# Patient Record
Sex: FEMALE | Race: OTHER | NOT HISPANIC OR LATINO | URBAN - METROPOLITAN AREA
[De-identification: names, ages, dates, MRNs, and addresses within clinical notes are randomized per-mention and may not be internally consistent; named-entity substitution may affect disease eponyms.]

---

## 2017-08-18 ENCOUNTER — OUTPATIENT (OUTPATIENT)
Dept: OUTPATIENT SERVICES | Facility: HOSPITAL | Age: 32
LOS: 1 days | End: 2017-08-18
Payer: COMMERCIAL

## 2017-08-18 VITALS
TEMPERATURE: 98 F | HEIGHT: 59 IN | SYSTOLIC BLOOD PRESSURE: 130 MMHG | RESPIRATION RATE: 16 BRPM | DIASTOLIC BLOOD PRESSURE: 84 MMHG | OXYGEN SATURATION: 98 % | WEIGHT: 169.98 LBS | HEART RATE: 95 BPM

## 2017-08-18 DIAGNOSIS — S93.439A SPRAIN OF TIBIOFIBULAR LIGAMENT OF UNSPECIFIED ANKLE, INITIAL ENCOUNTER: ICD-10-CM

## 2017-08-18 DIAGNOSIS — Z96.7 PRESENCE OF OTHER BONE AND TENDON IMPLANTS: Chronic | ICD-10-CM

## 2017-08-18 DIAGNOSIS — K08.499 PARTIAL LOSS OF TEETH DUE TO OTHER SPECIFIED CAUSE, UNSPECIFIED CLASS: Chronic | ICD-10-CM

## 2017-08-18 DIAGNOSIS — S82.841S DISPLACED BIMALLEOLAR FRACTURE OF RIGHT LOWER LEG, SEQUELA: ICD-10-CM

## 2017-08-18 DIAGNOSIS — Z98.890 OTHER SPECIFIED POSTPROCEDURAL STATES: Chronic | ICD-10-CM

## 2017-08-18 DIAGNOSIS — Z41.1 ENCOUNTER FOR COSMETIC SURGERY: Chronic | ICD-10-CM

## 2017-08-18 LAB
HCT VFR BLD CALC: 40.6 % — SIGNIFICANT CHANGE UP (ref 34.5–45)
HGB BLD-MCNC: 13.8 G/DL — SIGNIFICANT CHANGE UP (ref 11.5–15.5)
MCHC RBC-ENTMCNC: 29.6 PG — SIGNIFICANT CHANGE UP (ref 27–34)
MCHC RBC-ENTMCNC: 34 GM/DL — SIGNIFICANT CHANGE UP (ref 32–36)
MCV RBC AUTO: 87.1 FL — SIGNIFICANT CHANGE UP (ref 80–100)
PLATELET # BLD AUTO: 363 K/UL — SIGNIFICANT CHANGE UP (ref 150–400)
RBC # BLD: 4.66 M/UL — SIGNIFICANT CHANGE UP (ref 3.8–5.2)
RBC # FLD: 12.9 % — SIGNIFICANT CHANGE UP (ref 10.3–14.5)
WBC # BLD: 14.31 K/UL — HIGH (ref 3.8–10.5)
WBC # FLD AUTO: 14.31 K/UL — HIGH (ref 3.8–10.5)

## 2017-08-18 RX ORDER — SODIUM CHLORIDE 9 MG/ML
3 INJECTION INTRAMUSCULAR; INTRAVENOUS; SUBCUTANEOUS EVERY 8 HOURS
Qty: 0 | Refills: 0 | Status: DISCONTINUED | OUTPATIENT
Start: 2017-08-22 | End: 2017-09-06

## 2017-08-18 RX ORDER — CEFAZOLIN SODIUM 1 G
2000 VIAL (EA) INJECTION ONCE
Qty: 0 | Refills: 0 | Status: DISCONTINUED | OUTPATIENT
Start: 2017-08-22 | End: 2017-09-06

## 2017-08-18 RX ORDER — LIDOCAINE HCL 20 MG/ML
0.2 VIAL (ML) INJECTION ONCE
Qty: 0 | Refills: 0 | Status: DISCONTINUED | OUTPATIENT
Start: 2017-08-22 | End: 2017-09-06

## 2017-08-18 NOTE — H&P PST ADULT - MUSCULOSKELETAL
details… No joint pain, swelling or deformity; no limitation of movement joint swelling/decreased ROM/Right ankle detailed exam

## 2017-08-18 NOTE — H&P PST ADULT - PSH
H/O benign breast biopsy  - Left, 2014  S/P rhinoplasty  2012 H/O benign breast biopsy  - Left, 2014  S/P ORIF (open reduction internal fixation) fracture  Right ankle, 11/2016  S/P rhinoplasty  2012
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
## 2017-08-18 NOTE — H&P PST ADULT - NSANTHOSAYNRD_GEN_A_CORE
No. [place] screening performed.  STOP BANG Legend: 0-2 = LOW Risk; 3-4 = INTERMEDIATE Risk; 5-8 = HIGH Risk

## 2017-08-22 ENCOUNTER — OUTPATIENT (OUTPATIENT)
Dept: OUTPATIENT SERVICES | Facility: HOSPITAL | Age: 32
LOS: 1 days | End: 2017-08-22
Payer: COMMERCIAL

## 2017-08-22 VITALS
RESPIRATION RATE: 14 BRPM | SYSTOLIC BLOOD PRESSURE: 118 MMHG | HEART RATE: 96 BPM | OXYGEN SATURATION: 100 % | DIASTOLIC BLOOD PRESSURE: 78 MMHG | TEMPERATURE: 97 F

## 2017-08-22 VITALS
DIASTOLIC BLOOD PRESSURE: 80 MMHG | HEART RATE: 74 BPM | OXYGEN SATURATION: 100 % | SYSTOLIC BLOOD PRESSURE: 124 MMHG | RESPIRATION RATE: 18 BRPM

## 2017-08-22 DIAGNOSIS — S93.439A SPRAIN OF TIBIOFIBULAR LIGAMENT OF UNSPECIFIED ANKLE, INITIAL ENCOUNTER: ICD-10-CM

## 2017-08-22 DIAGNOSIS — Z41.1 ENCOUNTER FOR COSMETIC SURGERY: Chronic | ICD-10-CM

## 2017-08-22 DIAGNOSIS — Z96.7 PRESENCE OF OTHER BONE AND TENDON IMPLANTS: Chronic | ICD-10-CM

## 2017-08-22 DIAGNOSIS — Z98.890 OTHER SPECIFIED POSTPROCEDURAL STATES: Chronic | ICD-10-CM

## 2017-08-22 LAB — HCG UR QL: NEGATIVE — SIGNIFICANT CHANGE UP

## 2017-08-22 PROCEDURE — 76000 FLUOROSCOPY <1 HR PHYS/QHP: CPT

## 2017-08-22 PROCEDURE — 20680 REMOVAL OF IMPLANT DEEP: CPT

## 2017-08-22 PROCEDURE — 85027 COMPLETE CBC AUTOMATED: CPT

## 2017-08-22 PROCEDURE — 81025 URINE PREGNANCY TEST: CPT

## 2017-08-22 PROCEDURE — G0463: CPT

## 2017-08-22 RX ORDER — DOCUSATE SODIUM 100 MG
1 CAPSULE ORAL
Qty: 20 | Refills: 0
Start: 2017-08-22 | End: 2017-09-01

## 2017-08-22 RX ORDER — SODIUM CHLORIDE 9 MG/ML
1000 INJECTION, SOLUTION INTRAVENOUS
Qty: 0 | Refills: 0 | Status: DISCONTINUED | OUTPATIENT
Start: 2017-08-22 | End: 2017-09-06

## 2017-08-22 RX ORDER — OXYCODONE AND ACETAMINOPHEN 5; 325 MG/1; MG/1
1 TABLET ORAL
Qty: 20 | Refills: 0
Start: 2017-08-22

## 2017-08-22 NOTE — ASU DISCHARGE PLAN (ADULT/PEDIATRIC). - MEDICATION SUMMARY - MEDICATIONS TO TAKE
I will START or STAY ON the medications listed below when I get home from the hospital:    oxycodone-acetaminophen 5mg-325mg oral tablet  -- 1 tab(s) by mouth every 6 hours, As Needed -for moderate pain MDD:4  -- Caution federal law prohibits the transfer of this drug to any person other  than the person for whom it was prescribed.  May cause drowsiness.  Alcohol may intensify this effect.  Use care when operating dangerous machinery.  This prescription cannot be refilled.  This product contains acetaminophen.  Do not use  with any other product containing acetaminophen to prevent possible liver damage.  Using more of this medication than prescribed may cause serious breathing problems.    -- Indication: For Sprain of tibiofibular ligament of ankle    Colace 50 mg oral capsule  -- 1 cap(s) by mouth 2 times a day for prevention of opioid induced constipation  -- Medication should be taken with plenty of water.    -- Indication: For Sprain of tibiofibular ligament of ankle

## 2017-08-23 ENCOUNTER — TRANSCRIPTION ENCOUNTER (OUTPATIENT)
Age: 32
End: 2017-08-23

## 2018-06-18 ENCOUNTER — APPOINTMENT (OUTPATIENT)
Dept: OBGYN | Facility: CLINIC | Age: 33
End: 2018-06-18

## 2018-07-16 ENCOUNTER — TRANSCRIPTION ENCOUNTER (OUTPATIENT)
Age: 33
End: 2018-07-16

## 2018-07-23 ENCOUNTER — APPOINTMENT (OUTPATIENT)
Dept: OBGYN | Facility: CLINIC | Age: 33
End: 2018-07-23
Payer: MEDICAID

## 2018-07-23 PROCEDURE — 99202 OFFICE O/P NEW SF 15 MIN: CPT

## 2018-10-05 ENCOUNTER — TRANSCRIPTION ENCOUNTER (OUTPATIENT)
Age: 33
End: 2018-10-05

## 2018-12-09 NOTE — H&P PST ADULT - NSANTHSNORERD_ENT_A_CORE
Telephone Encounter by Anahi Scott Anahi Scott (Patient )            Anahi Scott Patient  Sign    M - Manual             Yes

## 2019-10-30 NOTE — H&P PST ADULT - NS SC CAGE ALCOHOL CUT DOWN
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  Was tx here with metrogel in may and symptoms were much better for most of Summer  This feels slightly different  No dysuria  No lbp  Just feels "off"    UA +  Rx keflex 500mg bid x 7 days      The following portions of the patient's history were reviewed and updated as appropriate: allergies, current medications, past family history, past medical history, past social history, past surgical history and problem list     Review of Systems   Constitutional: Negative for chills, fever and unexpected weight change  Gastrointestinal: Negative for abdominal pain, blood in stool, constipation and diarrhea  Genitourinary: Negative  Objective:      /82 (BP Location: Left arm, Patient Position: Sitting, Cuff Size: Standard)   Ht 5' 1 06" (1 551 m)   Wt 72 6 kg (160 lb)   LMP  (LMP Unknown)   BMI 30 17 kg/m²          Physical Exam   Constitutional: She appears well-developed and well-nourished  Genitourinary: Vagina normal  Pelvic exam was performed with patient supine  There is no rash or lesion on the right labia  There is no rash or lesion on the left labia  Cervix exhibits no motion tenderness, no discharge and no friability  Lymphadenopathy: No inguinal adenopathy noted on the right or left side  Nursing note and vitals reviewed 
The patient is here because she has vaginal discomfort  The patient has a vaginal-rectal fistula 
no

## 2020-01-07 PROBLEM — E66.9 OBESITY, UNSPECIFIED: Chronic | Status: ACTIVE | Noted: 2017-08-18

## 2020-01-21 ENCOUNTER — LABORATORY RESULT (OUTPATIENT)
Age: 35
End: 2020-01-21

## 2020-01-22 ENCOUNTER — OUTPATIENT (OUTPATIENT)
Dept: OUTPATIENT SERVICES | Facility: HOSPITAL | Age: 35
LOS: 1 days | End: 2020-01-22
Payer: COMMERCIAL

## 2020-01-22 ENCOUNTER — APPOINTMENT (OUTPATIENT)
Dept: OBGYN | Facility: CLINIC | Age: 35
End: 2020-01-22
Payer: COMMERCIAL

## 2020-01-22 DIAGNOSIS — N76.0 ACUTE VAGINITIS: ICD-10-CM

## 2020-01-22 DIAGNOSIS — Z00.00 ENCOUNTER FOR GENERAL ADULT MEDICAL EXAMINATION W/OUT ABNORMAL FINDINGS: ICD-10-CM

## 2020-01-22 DIAGNOSIS — Z41.1 ENCOUNTER FOR COSMETIC SURGERY: Chronic | ICD-10-CM

## 2020-01-22 DIAGNOSIS — Z87.42 PERSONAL HISTORY OF OTHER DISEASES OF THE FEMALE GENITAL TRACT: ICD-10-CM

## 2020-01-22 DIAGNOSIS — Z96.7 PRESENCE OF OTHER BONE AND TENDON IMPLANTS: Chronic | ICD-10-CM

## 2020-01-22 DIAGNOSIS — Z98.890 OTHER SPECIFIED POSTPROCEDURAL STATES: Chronic | ICD-10-CM

## 2020-01-22 PROCEDURE — 88175 CYTOPATH C/V AUTO FLUID REDO: CPT

## 2020-01-22 PROCEDURE — 99395 PREV VISIT EST AGE 18-39: CPT | Mod: NC

## 2020-01-22 PROCEDURE — 87491 CHLMYD TRACH DNA AMP PROBE: CPT

## 2020-01-22 PROCEDURE — G0463: CPT

## 2020-01-22 PROCEDURE — 87591 N.GONORRHOEAE DNA AMP PROB: CPT

## 2020-01-22 PROCEDURE — 87624 HPV HI-RISK TYP POOLED RSLT: CPT

## 2020-01-22 NOTE — PROCEDURE
[Cervical Pap Smear] : cervical Pap smear [GC Chlamydia Culture] : GC Chlamydia Culture [Liquid Base] : liquid base [Tolerated Well] : the patient tolerated the procedure well [No Complications] : there were no complications

## 2020-01-22 NOTE — PHYSICAL EXAM
[Awake] : awake [Alert] : alert [Acute Distress] : no acute distress [Mass] : no breast mass [Axillary LAD] : no axillary lymphadenopathy [Nipple Discharge] : no nipple discharge [Tender] : non tender [Soft] : soft [Oriented x3] : oriented to person, place, and time [Normal] : cervix [Labia Minora] : labia minora [Labia Majora] : labia major [No Bleeding] : there was no active vaginal bleeding [Nabothian Cyst ___ cm] : had a [unfilled] ~Ucm nabothian cyst [Pap Obtained] : a Pap smear was performed [Uterine Adnexae] : were not tender and not enlarged [Motion Tenderness] : there was no cervical motion tenderness

## 2020-01-23 LAB
C TRACH RRNA SPEC QL NAA+PROBE: SIGNIFICANT CHANGE UP
HPV HIGH+LOW RISK DNA PNL CVX: SIGNIFICANT CHANGE UP
N GONORRHOEA RRNA SPEC QL NAA+PROBE: SIGNIFICANT CHANGE UP
SPECIMEN SOURCE: SIGNIFICANT CHANGE UP

## 2020-01-26 LAB — CYTOLOGY SPEC DOC CYTO: SIGNIFICANT CHANGE UP

## 2020-02-03 DIAGNOSIS — Z01.419 ENCOUNTER FOR GYNECOLOGICAL EXAMINATION (GENERAL) (ROUTINE) WITHOUT ABNORMAL FINDINGS: ICD-10-CM

## 2020-07-27 ENCOUNTER — LABORATORY RESULT (OUTPATIENT)
Age: 35
End: 2020-07-27

## 2020-07-27 ENCOUNTER — APPOINTMENT (OUTPATIENT)
Dept: OBGYN | Facility: CLINIC | Age: 35
End: 2020-07-27
Payer: MEDICAID

## 2020-07-27 ENCOUNTER — OUTPATIENT (OUTPATIENT)
Dept: OUTPATIENT SERVICES | Facility: HOSPITAL | Age: 35
LOS: 1 days | End: 2020-07-27
Payer: COMMERCIAL

## 2020-07-27 ENCOUNTER — RESULT CHARGE (OUTPATIENT)
Age: 35
End: 2020-07-27

## 2020-07-27 VITALS — WEIGHT: 174 LBS | DIASTOLIC BLOOD PRESSURE: 80 MMHG | BODY MASS INDEX: 35.14 KG/M2 | SYSTOLIC BLOOD PRESSURE: 122 MMHG

## 2020-07-27 DIAGNOSIS — N76.0 ACUTE VAGINITIS: ICD-10-CM

## 2020-07-27 DIAGNOSIS — Z98.890 OTHER SPECIFIED POSTPROCEDURAL STATES: Chronic | ICD-10-CM

## 2020-07-27 DIAGNOSIS — Z87.440 PERSONAL HISTORY OF URINARY (TRACT) INFECTIONS: ICD-10-CM

## 2020-07-27 DIAGNOSIS — Z41.1 ENCOUNTER FOR COSMETIC SURGERY: Chronic | ICD-10-CM

## 2020-07-27 DIAGNOSIS — Z96.7 PRESENCE OF OTHER BONE AND TENDON IMPLANTS: Chronic | ICD-10-CM

## 2020-07-27 PROCEDURE — 87389 HIV-1 AG W/HIV-1&-2 AB AG IA: CPT

## 2020-07-27 PROCEDURE — 86780 TREPONEMA PALLIDUM: CPT

## 2020-07-27 PROCEDURE — 81003 URINALYSIS AUTO W/O SCOPE: CPT

## 2020-07-27 PROCEDURE — G0463: CPT

## 2020-07-27 PROCEDURE — 99213 OFFICE O/P EST LOW 20 MIN: CPT | Mod: NC

## 2020-07-27 PROCEDURE — 87340 HEPATITIS B SURFACE AG IA: CPT

## 2020-07-27 PROCEDURE — 86803 HEPATITIS C AB TEST: CPT

## 2020-07-28 LAB
HBV SURFACE AG SER-ACNC: SIGNIFICANT CHANGE UP
HCV AB S/CO SERPL IA: 0.09 S/CO — SIGNIFICANT CHANGE UP (ref 0–0.99)
HCV AB SERPL-IMP: SIGNIFICANT CHANGE UP
HIV 1+2 AB+HIV1 P24 AG SERPL QL IA: SIGNIFICANT CHANGE UP
T PALLIDUM AB TITR SER: NEGATIVE — SIGNIFICANT CHANGE UP
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
## 2020-07-28 NOTE — PHYSICAL EXAM
[Normal] : cervix [Labia Majora] : labia major [No Bleeding] : there was no active vaginal bleeding [Normal Position] : in a normal position [Discharge] : no discharge [Pap Obtained] : a Pap smear was not performed [Tenderness] : nontender [Motion Tenderness] : there was no cervical motion tenderness [Mass ___ cm] : no uterine mass was palpated [Adnexa Tenderness] : were not tender [de-identified] : no evidence of trauma, no lesions, no vesicles.  Mild tenderness with palpation.   [FreeTextEntry4] : no bruising or lacerations noted [Ovarian Mass (___ Cm)] : there were no adnexal masses

## 2020-07-29 LAB
BILIRUB UR QL STRIP: NORMAL
GLUCOSE UR-MCNC: NORMAL
HCG UR QL: 0.2 EU/DL
HCG UR QL: NEGATIVE
HGB UR QL STRIP.AUTO: NORMAL
KETONES UR-MCNC: NORMAL
LEUKOCYTE ESTERASE UR QL STRIP: NORMAL
NITRITE UR QL STRIP: NORMAL
PH UR STRIP: 6
PROT UR STRIP-MCNC: NORMAL
SP GR UR STRIP: 1.02

## 2020-08-08 DIAGNOSIS — Z11.3 ENCOUNTER FOR SCREENING FOR INFECTIONS WITH A PREDOMINANTLY SEXUAL MODE OF TRANSMISSION: ICD-10-CM

## 2020-08-08 DIAGNOSIS — N39.0 URINARY TRACT INFECTION, SITE NOT SPECIFIED: ICD-10-CM

## 2020-11-26 ENCOUNTER — TRANSCRIPTION ENCOUNTER (OUTPATIENT)
Age: 35
End: 2020-11-26

## 2020-12-04 ENCOUNTER — OUTPATIENT (OUTPATIENT)
Dept: OUTPATIENT SERVICES | Facility: HOSPITAL | Age: 35
LOS: 1 days | Discharge: HOME | End: 2020-12-04

## 2020-12-04 DIAGNOSIS — Z96.7 PRESENCE OF OTHER BONE AND TENDON IMPLANTS: Chronic | ICD-10-CM

## 2020-12-04 DIAGNOSIS — D72.89 OTHER SPECIFIED DISORDERS OF WHITE BLOOD CELLS: ICD-10-CM

## 2020-12-04 DIAGNOSIS — Z98.890 OTHER SPECIFIED POSTPROCEDURAL STATES: Chronic | ICD-10-CM

## 2020-12-04 DIAGNOSIS — Z41.1 ENCOUNTER FOR COSMETIC SURGERY: Chronic | ICD-10-CM

## 2020-12-07 ENCOUNTER — RESULT REVIEW (OUTPATIENT)
Age: 35
End: 2020-12-07

## 2020-12-07 ENCOUNTER — APPOINTMENT (OUTPATIENT)
Dept: HEMATOLOGY ONCOLOGY | Facility: CLINIC | Age: 35
End: 2020-12-07
Payer: MEDICAID

## 2020-12-07 VITALS
SYSTOLIC BLOOD PRESSURE: 126 MMHG | BODY MASS INDEX: 34.2 KG/M2 | WEIGHT: 178.79 LBS | DIASTOLIC BLOOD PRESSURE: 85 MMHG | OXYGEN SATURATION: 98 % | HEIGHT: 60.63 IN | TEMPERATURE: 97.2 F | RESPIRATION RATE: 16 BRPM | HEART RATE: 96 BPM

## 2020-12-07 DIAGNOSIS — R76.8 OTHER SPECIFIED ABNORMAL IMMUNOLOGICAL FINDINGS IN SERUM: ICD-10-CM

## 2020-12-07 LAB
BASOPHILS # BLD AUTO: 0.08 K/UL — SIGNIFICANT CHANGE UP (ref 0–0.2)
BASOPHILS NFR BLD AUTO: 0.5 % — SIGNIFICANT CHANGE UP (ref 0–2)
EOSINOPHIL # BLD AUTO: 0.17 K/UL — SIGNIFICANT CHANGE UP (ref 0–0.5)
EOSINOPHIL NFR BLD AUTO: 1 % — SIGNIFICANT CHANGE UP (ref 0–6)
ERYTHROCYTE [SEDIMENTATION RATE] IN BLOOD: 8 MM/HR — SIGNIFICANT CHANGE UP (ref 0–15)
HCT VFR BLD CALC: 44.3 % — SIGNIFICANT CHANGE UP (ref 34.5–45)
HGB BLD-MCNC: 15.1 G/DL — SIGNIFICANT CHANGE UP (ref 11.5–15.5)
IMM GRANULOCYTES NFR BLD AUTO: 1 % — SIGNIFICANT CHANGE UP (ref 0–1.5)
LYMPHOCYTES # BLD AUTO: 15.2 % — SIGNIFICANT CHANGE UP (ref 13–44)
LYMPHOCYTES # BLD AUTO: 2.55 K/UL — SIGNIFICANT CHANGE UP (ref 1–3.3)
MCHC RBC-ENTMCNC: 29.4 PG — SIGNIFICANT CHANGE UP (ref 27–34)
MCHC RBC-ENTMCNC: 34.1 G/DL — SIGNIFICANT CHANGE UP (ref 32–36)
MCV RBC AUTO: 86.2 FL — SIGNIFICANT CHANGE UP (ref 80–100)
MONOCYTES # BLD AUTO: 0.96 K/UL — HIGH (ref 0–0.9)
MONOCYTES NFR BLD AUTO: 5.7 % — SIGNIFICANT CHANGE UP (ref 2–14)
NEUTROPHILS # BLD AUTO: 12.85 K/UL — HIGH (ref 1.8–7.4)
NEUTROPHILS NFR BLD AUTO: 76.6 % — SIGNIFICANT CHANGE UP (ref 43–77)
NRBC # BLD: 0 /100 WBCS — SIGNIFICANT CHANGE UP (ref 0–0)
PLATELET # BLD AUTO: 419 K/UL — HIGH (ref 150–400)
RBC # BLD: 5.14 M/UL — SIGNIFICANT CHANGE UP (ref 3.8–5.2)
RBC # FLD: 11.9 % — SIGNIFICANT CHANGE UP (ref 10.3–14.5)
WBC # BLD: 16.78 K/UL — HIGH (ref 3.8–10.5)
WBC # FLD AUTO: 16.78 K/UL — HIGH (ref 3.8–10.5)

## 2020-12-07 PROCEDURE — 99204 OFFICE O/P NEW MOD 45 MIN: CPT

## 2020-12-07 PROCEDURE — 99072 ADDL SUPL MATRL&STAF TM PHE: CPT

## 2020-12-07 RX ORDER — NITROFURANTOIN (MONOHYDRATE/MACROCRYSTALS) 25; 75 MG/1; MG/1
100 CAPSULE ORAL
Qty: 14 | Refills: 0 | Status: DISCONTINUED | COMMUNITY
Start: 2020-07-27 | End: 2020-12-07

## 2020-12-07 RX ORDER — CEFTRIAXONE 250 MG/1
250 INJECTION, POWDER, FOR SOLUTION INTRAMUSCULAR; INTRAVENOUS
Qty: 1 | Refills: 0 | Status: DISCONTINUED | COMMUNITY
Start: 2020-07-28 | End: 2020-12-07

## 2020-12-07 RX ORDER — ALBUTEROL SULFATE 90 UG/1
108 (90 BASE) AEROSOL, METERED RESPIRATORY (INHALATION) EVERY 6 HOURS
Qty: 1 | Refills: 0 | Status: ACTIVE | COMMUNITY
Start: 2020-12-07

## 2020-12-07 RX ORDER — METRONIDAZOLE 500 MG/1
500 TABLET ORAL ONCE
Qty: 4 | Refills: 0 | Status: DISCONTINUED | COMMUNITY
Start: 2020-07-28 | End: 2020-12-07

## 2020-12-07 RX ORDER — AZITHROMYCIN 250 MG/1
250 TABLET, FILM COATED ORAL
Qty: 4 | Refills: 0 | Status: DISCONTINUED | COMMUNITY
Start: 2020-07-28 | End: 2020-12-07

## 2020-12-07 RX ORDER — ULIPRISTAL ACETATE 30 MG/1
30 TABLET ORAL
Qty: 1 | Refills: 0 | Status: DISCONTINUED | COMMUNITY
Start: 2020-07-27 | End: 2020-12-07
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
## 2020-12-08 NOTE — ASSESSMENT
[FreeTextEntry1] : This is a 35 year old woman with a syndrome of worsening allergic type reactions, skin rashes, dermatographia, and asthma exacerbations.  Patient denies dizziness or syncope.  Will check tryptase to evaluate for mast cell disorder, evaluate for KIT mutation.  Check Immunoglobulins and IgE again and run SPEP, free light chains, and  immunofixation rule out clonal process, screen for plasma cell dyscrasia.  Run flow cytometry rule out clonal lymphocytic disorder.   if tests are negative, suspect that the IgE elevation and leukocytosis is a reactive process, likely to allergic reaction. Can seek allergy/immunology evaluation following rule out of hematologic disorders. \par \par On a side note, patient had a breast cyst that was being followed, but missed her opportunity to do so earlier in the year given pandemic. Re-issued her script for ultrasound.
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
## 2020-12-14 LAB
ALBUMIN MFR SERPL ELPH: 60.8 %
ALBUMIN SERPL ELPH-MCNC: 4.6 G/DL
ALBUMIN SERPL-MCNC: 4.4 G/DL
ALBUMIN/GLOB SERPL: 1.6 RATIO
ALP BLD-CCNC: 122 U/L
ALPHA1 GLOB MFR SERPL ELPH: 4.4 %
ALPHA1 GLOB SERPL ELPH-MCNC: 0.3 G/DL
ALPHA2 GLOB MFR SERPL ELPH: 9.3 %
ALPHA2 GLOB SERPL ELPH-MCNC: 0.7 G/DL
ALT SERPL-CCNC: 67 U/L
ANA SER IF-ACNC: NEGATIVE
ANION GAP SERPL CALC-SCNC: 15 MMOL/L
AST SERPL-CCNC: 45 U/L
B-GLOBULIN MFR SERPL ELPH: 10.8 %
B-GLOBULIN SERPL ELPH-MCNC: 0.8 G/DL
BILIRUB SERPL-MCNC: 0.3 MG/DL
BUN SERPL-MCNC: 10 MG/DL
CALCIUM SERPL-MCNC: 9.9 MG/DL
CHLORIDE SERPL-SCNC: 99 MMOL/L
CO2 SERPL-SCNC: 26 MMOL/L
CREAT SERPL-MCNC: 0.94 MG/DL
CRP SERPL-MCNC: 1.74 MG/DL
DEPRECATED KAPPA LC FREE/LAMBDA SER: 1.55 RATIO
DEPRECATED KAPPA LC FREE/LAMBDA SER: 1.55 RATIO
GAMMA GLOB FLD ELPH-MCNC: 1.1 G/DL
GAMMA GLOB MFR SERPL ELPH: 14.7 %
GLUCOSE SERPL-MCNC: 79 MG/DL
IGA SER QL IEP: 157 MG/DL
IGE AB SERPL QL: 8 NG/ML
IGG SER QL IEP: 951 MG/DL
IGM SER QL IEP: 144 MG/DL
INTERPRETATION SERPL IEP-IMP: NORMAL
KAPPA LC CSF-MCNC: 1.54 MG/DL
KAPPA LC CSF-MCNC: 1.54 MG/DL
KAPPA LC SERPL-MCNC: 2.38 MG/DL
KAPPA LC SERPL-MCNC: 2.38 MG/DL
M PROTEIN SPEC IFE-MCNC: NORMAL
POTASSIUM SERPL-SCNC: 4.1 MMOL/L
PROT SERPL-MCNC: 7.2 G/DL
RHEUMATOID FACT SER QL: <10 IU/ML
SODIUM SERPL-SCNC: 140 MMOL/L
TRYPTASE: 3.3 UG/L

## 2020-12-24 LAB — KIT ASP816VAL MUTATION B: NORMAL

## 2021-01-18 ENCOUNTER — APPOINTMENT (OUTPATIENT)
Dept: ALLERGY | Facility: CLINIC | Age: 36
End: 2021-01-18
Payer: MEDICAID

## 2021-01-18 VITALS — RESPIRATION RATE: 16 BRPM | OXYGEN SATURATION: 99 % | HEIGHT: 59 IN | TEMPERATURE: 98.5 F | HEART RATE: 98 BPM

## 2021-01-18 VITALS — SYSTOLIC BLOOD PRESSURE: 126 MMHG | DIASTOLIC BLOOD PRESSURE: 70 MMHG

## 2021-01-18 DIAGNOSIS — J45.30 MILD PERSISTENT ASTHMA, UNCOMPLICATED: ICD-10-CM

## 2021-01-18 PROCEDURE — 99204 OFFICE O/P NEW MOD 45 MIN: CPT

## 2021-01-18 PROCEDURE — 99072 ADDL SUPL MATRL&STAF TM PHE: CPT

## 2021-01-18 RX ORDER — CLINDAMYCIN PHOSPHATE 10 MG/ML
1 SOLUTION TOPICAL
Qty: 30 | Refills: 0 | Status: DISCONTINUED | COMMUNITY
Start: 2020-09-16

## 2021-01-18 RX ORDER — ALBUTEROL SULFATE 2.5 MG/3ML
(2.5 MG/3ML) SOLUTION RESPIRATORY (INHALATION)
Qty: 150 | Refills: 0 | Status: DISCONTINUED | COMMUNITY
Start: 2020-10-06

## 2021-01-18 RX ORDER — TRIAMCINOLONE ACETONIDE 1 MG/G
0.1 CREAM TOPICAL
Qty: 80 | Refills: 0 | Status: DISCONTINUED | COMMUNITY
Start: 2020-09-16

## 2021-01-18 RX ORDER — ALBUTEROL SULFATE 90 UG/1
108 (90 BASE) POWDER, METERED RESPIRATORY (INHALATION)
Qty: 1 | Refills: 0 | Status: DISCONTINUED | COMMUNITY
Start: 2020-08-06

## 2021-01-18 RX ORDER — PREDNISONE 20 MG/1
20 TABLET ORAL
Qty: 10 | Refills: 0 | Status: DISCONTINUED | COMMUNITY
Start: 2020-10-06

## 2021-01-18 RX ORDER — METHYLPREDNISOLONE 4 MG/1
4 TABLET ORAL
Qty: 21 | Refills: 0 | Status: DISCONTINUED | COMMUNITY
Start: 2020-08-27

## 2021-01-18 RX ORDER — OFLOXACIN 3 MG/ML
0.3 SOLUTION/ DROPS OPHTHALMIC
Qty: 5 | Refills: 0 | Status: DISCONTINUED | COMMUNITY
Start: 2020-11-26

## 2021-01-18 NOTE — SOCIAL HISTORY
[House] : [unfilled] lives in a house  [Radiator/Baseboard] : heating provided by radiator(s)/baseboard(s) [Window Units] : air conditioning provided by window units [None] : none [] :  [de-identified] : lives alone  [FreeTextEntry2] : teacher  [Bedroom] : not in the bedroom [Basement] : not in the basement [Living Area] : not in the living area [Smokers in Household] : there are no smokers in the home

## 2021-01-18 NOTE — PHYSICAL EXAM
[Alert] : alert [Well Nourished] : well nourished [Healthy Appearance] : healthy appearance [No Acute Distress] : no acute distress [Well Developed] : well developed [Normal Pupil & Iris Size/Symmetry] : normal pupil and iris size and symmetry [No Discharge] : no discharge [No Photophobia] : no photophobia [Sclera Not Icteric] : sclera not icteric [No Neck Mass] : no neck mass was observed [No LAD] : no lymphadenopathy [No Thyroid Mass] : no thyroid mass [Supple] : the neck was supple [Normal Rate and Effort] : normal respiratory rhythm and effort [No Crackles] : no crackles [No Retractions] : no retractions [Bilateral Audible Breath Sounds] : bilateral audible breath sounds [Normal Rate] : heart rate was normal  [Normal S1, S2] : normal S1 and S2 [No murmur] : no murmur [Regular Rhythm] : with a regular rhythm [Normal Cervical Lymph Nodes] : cervical [No clubbing] : no clubbing [No Edema] : no edema [No Cyanosis] : no cyanosis [Normal Mood] : mood was normal [Normal Affect] : affect was normal [Alert, Awake, Oriented as Age-Appropriate] : alert, awake, oriented as age appropriate [Pale mucosa] : no pale mucosa [de-identified] : dermatographism

## 2021-01-18 NOTE — HISTORY OF PRESENT ILLNESS
[Eczematous rashes] : eczematous rashes [Venom Reactions] : venom reactions [de-identified] : Patient with asthma since age 18.   She has only rescue inhaler to use prn.   Her symptoms are worse during the fall months.   She has needed ER treatment every fall 1-2x - she is treated with nebulizer and oral steroids.   Her symptoms are also more problematic with dust exposure.   She does not exercise.   She also has increased nasal and ocular allergies spring and fall.  \par \par Patient with sporadic rash on her body.   She has seen dermatologist and it was noted that she has an elevated IgE level - varied from 2076 to 872 KU/L. \par \par No history of eczema \par

## 2021-01-18 NOTE — ASSESSMENT
[FreeTextEntry1] : Mild persistent asthma:\par \par Singulair 10 mg QD preventatively \par Proair 2 puffs QID prn \par \par Dermatographism:\par \par Allegra 180 mg QD preventatively. \par \par Increased IgE secondary to underlying environmental allergies. \par \par RV 2-3 months

## 2021-01-19 RX ORDER — FEXOFENADINE HYDROCHLORIDE 180 MG/1
180 TABLET ORAL DAILY
Qty: 90 | Refills: 1 | Status: ACTIVE | COMMUNITY
Start: 2021-01-19 | End: 1900-01-01

## 2021-01-19 RX ORDER — MONTELUKAST 10 MG/1
10 TABLET, FILM COATED ORAL DAILY
Qty: 90 | Refills: 1 | Status: ACTIVE | COMMUNITY
Start: 2021-01-18 | End: 1900-01-01

## 2021-04-21 ENCOUNTER — APPOINTMENT (OUTPATIENT)
Dept: ALLERGY | Facility: CLINIC | Age: 36
End: 2021-04-21

## 2021-04-27 ENCOUNTER — LABORATORY RESULT (OUTPATIENT)
Age: 36
End: 2021-04-27

## 2021-04-27 ENCOUNTER — OUTPATIENT (OUTPATIENT)
Dept: OUTPATIENT SERVICES | Facility: HOSPITAL | Age: 36
LOS: 1 days | End: 2021-04-27
Payer: COMMERCIAL

## 2021-04-27 ENCOUNTER — APPOINTMENT (OUTPATIENT)
Dept: OBGYN | Facility: CLINIC | Age: 36
End: 2021-04-27
Payer: MEDICAID

## 2021-04-27 VITALS
HEIGHT: 59 IN | WEIGHT: 182 LBS | BODY MASS INDEX: 36.69 KG/M2 | DIASTOLIC BLOOD PRESSURE: 80 MMHG | SYSTOLIC BLOOD PRESSURE: 122 MMHG

## 2021-04-27 VITALS — TEMPERATURE: 97.8 F

## 2021-04-27 DIAGNOSIS — Z41.1 ENCOUNTER FOR COSMETIC SURGERY: Chronic | ICD-10-CM

## 2021-04-27 DIAGNOSIS — N64.4 MASTODYNIA: ICD-10-CM

## 2021-04-27 DIAGNOSIS — Z01.419 ENCOUNTER FOR GYNECOLOGICAL EXAMINATION (GENERAL) (ROUTINE) W/OUT ABNORMAL FINDINGS: ICD-10-CM

## 2021-04-27 DIAGNOSIS — Z98.890 OTHER SPECIFIED POSTPROCEDURAL STATES: Chronic | ICD-10-CM

## 2021-04-27 DIAGNOSIS — N60.09 SOLITARY CYST OF UNSPECIFIED BREAST: ICD-10-CM

## 2021-04-27 DIAGNOSIS — R10.2 PELVIC AND PERINEAL PAIN: ICD-10-CM

## 2021-04-27 DIAGNOSIS — N76.0 ACUTE VAGINITIS: ICD-10-CM

## 2021-04-27 DIAGNOSIS — Z01.419 ENCOUNTER FOR GYNECOLOGICAL EXAMINATION (GENERAL) (ROUTINE) WITHOUT ABNORMAL FINDINGS: ICD-10-CM

## 2021-04-27 DIAGNOSIS — Z11.3 ENCOUNTER FOR SCREENING FOR INFECTIONS WITH A PREDOMINANTLY SEXUAL MODE OF TRANSMISSION: ICD-10-CM

## 2021-04-27 DIAGNOSIS — Z96.7 PRESENCE OF OTHER BONE AND TENDON IMPLANTS: Chronic | ICD-10-CM

## 2021-04-27 DIAGNOSIS — T74.21XA ADULT SEXUAL ABUSE, CONFIRMED, INITIAL ENCOUNTER: ICD-10-CM

## 2021-04-27 PROCEDURE — 87491 CHLMYD TRACH DNA AMP PROBE: CPT

## 2021-04-27 PROCEDURE — 99395 PREV VISIT EST AGE 18-39: CPT

## 2021-04-27 PROCEDURE — 86780 TREPONEMA PALLIDUM: CPT

## 2021-04-27 PROCEDURE — 81003 URINALYSIS AUTO W/O SCOPE: CPT

## 2021-04-27 PROCEDURE — 81003 URINALYSIS AUTO W/O SCOPE: CPT | Mod: NC,QW

## 2021-04-27 PROCEDURE — 87591 N.GONORRHOEAE DNA AMP PROB: CPT

## 2021-04-27 PROCEDURE — 87086 URINE CULTURE/COLONY COUNT: CPT

## 2021-04-27 PROCEDURE — G0463: CPT

## 2021-04-27 PROCEDURE — 87624 HPV HI-RISK TYP POOLED RSLT: CPT

## 2021-04-27 PROCEDURE — 87389 HIV-1 AG W/HIV-1&-2 AB AG IA: CPT

## 2021-04-27 NOTE — HISTORY OF PRESENT ILLNESS
[6 Months Ago] : 6 months ago [Fair] : being in fair health [Regular Exercise] : not exercising regularly [Weight Concerns] : weight concens [Reproductive Age] : is of reproductive age [Definite:  ___ (Date)] : the last menstrual period was [unfilled] [Normal Amount/Duration] : was of a normal amount and duration [Spotting Between  Menses] : no spotting between menses [Regular Cycle Intervals] : periods have been regular [Sexually Active] : is sexually active

## 2021-04-28 ENCOUNTER — LABORATORY RESULT (OUTPATIENT)
Age: 36
End: 2021-04-28

## 2021-04-28 LAB
C TRACH RRNA SPEC QL NAA+PROBE: SIGNIFICANT CHANGE UP
HIV 1+2 AB+HIV1 P24 AG SERPL QL IA: SIGNIFICANT CHANGE UP
HPV HIGH+LOW RISK DNA PNL CVX: SIGNIFICANT CHANGE UP
N GONORRHOEA RRNA SPEC QL NAA+PROBE: SIGNIFICANT CHANGE UP
SPECIMEN SOURCE: SIGNIFICANT CHANGE UP
T PALLIDUM AB TITR SER: NEGATIVE — SIGNIFICANT CHANGE UP

## 2021-04-29 LAB
CULTURE RESULTS: SIGNIFICANT CHANGE UP
SPECIMEN SOURCE: SIGNIFICANT CHANGE UP

## 2021-04-30 LAB — CYTOLOGY SPEC DOC CYTO: SIGNIFICANT CHANGE UP

## 2021-07-02 ENCOUNTER — NON-APPOINTMENT (OUTPATIENT)
Age: 36
End: 2021-07-02

## 2021-07-29 ENCOUNTER — TRANSCRIPTION ENCOUNTER (OUTPATIENT)
Age: 36
End: 2021-07-29

## 2021-07-30 ENCOUNTER — APPOINTMENT (OUTPATIENT)
Dept: DISASTER EMERGENCY | Facility: HOSPITAL | Age: 36
End: 2021-07-30

## 2022-08-03 ENCOUNTER — APPOINTMENT (OUTPATIENT)
Dept: HUMAN REPRODUCTION | Facility: CLINIC | Age: 37
End: 2022-08-03

## 2022-12-21 NOTE — BRIEF OPERATIVE NOTE - SPECIMENS
None Adjacent Tissue Transfer Text: The defect edges were debeveled with a #15 scalpel blade.  Given the location of the defect and the proximity to free margins an adjacent tissue transfer was deemed most appropriate.  Using a sterile surgical marker, an appropriate flap was drawn incorporating the defect and placing the expected incisions within the relaxed skin tension lines where possible.    The area thus outlined was incised deep to adipose tissue with a #15 scalpel blade.  The skin margins were undermined to an appropriate distance in all directions utilizing iris scissors.

## 2023-06-06 ENCOUNTER — INPATIENT (INPATIENT)
Facility: HOSPITAL | Age: 38
LOS: 1 days | Discharge: HOME | DRG: 392 | End: 2023-06-08
Attending: INTERNAL MEDICINE | Admitting: INTERNAL MEDICINE
Payer: COMMERCIAL

## 2023-06-06 VITALS
HEART RATE: 108 BPM | OXYGEN SATURATION: 96 % | HEIGHT: 60 IN | WEIGHT: 171.08 LBS | DIASTOLIC BLOOD PRESSURE: 86 MMHG | SYSTOLIC BLOOD PRESSURE: 128 MMHG | RESPIRATION RATE: 20 BRPM | TEMPERATURE: 98 F

## 2023-06-06 DIAGNOSIS — Z41.1 ENCOUNTER FOR COSMETIC SURGERY: Chronic | ICD-10-CM

## 2023-06-06 DIAGNOSIS — R74.01 ELEVATION OF LEVELS OF LIVER TRANSAMINASE LEVELS: ICD-10-CM

## 2023-06-06 DIAGNOSIS — R10.9 UNSPECIFIED ABDOMINAL PAIN: ICD-10-CM

## 2023-06-06 DIAGNOSIS — Z96.7 PRESENCE OF OTHER BONE AND TENDON IMPLANTS: Chronic | ICD-10-CM

## 2023-06-06 DIAGNOSIS — Z29.9 ENCOUNTER FOR PROPHYLACTIC MEASURES, UNSPECIFIED: ICD-10-CM

## 2023-06-06 DIAGNOSIS — R79.89 OTHER SPECIFIED ABNORMAL FINDINGS OF BLOOD CHEMISTRY: ICD-10-CM

## 2023-06-06 DIAGNOSIS — Z98.890 OTHER SPECIFIED POSTPROCEDURAL STATES: Chronic | ICD-10-CM

## 2023-06-06 DIAGNOSIS — E11.9 TYPE 2 DIABETES MELLITUS WITHOUT COMPLICATIONS: ICD-10-CM

## 2023-06-06 LAB
ALBUMIN SERPL ELPH-MCNC: 4.4 G/DL — SIGNIFICANT CHANGE UP (ref 3.3–5)
ALP SERPL-CCNC: 150 U/L — HIGH (ref 40–120)
ALT FLD-CCNC: 53 U/L — HIGH (ref 10–45)
ANION GAP SERPL CALC-SCNC: 14 MMOL/L — SIGNIFICANT CHANGE UP (ref 5–17)
APPEARANCE UR: ABNORMAL
AST SERPL-CCNC: 41 U/L — HIGH (ref 10–40)
BACTERIA # UR AUTO: ABNORMAL
BASE EXCESS BLDV CALC-SCNC: -0.3 MMOL/L — SIGNIFICANT CHANGE UP (ref -2–3)
BASOPHILS # BLD AUTO: 0.06 K/UL — SIGNIFICANT CHANGE UP (ref 0–0.2)
BASOPHILS NFR BLD AUTO: 0.4 % — SIGNIFICANT CHANGE UP (ref 0–2)
BILIRUB SERPL-MCNC: 0.6 MG/DL — SIGNIFICANT CHANGE UP (ref 0.2–1.2)
BILIRUB UR-MCNC: NEGATIVE — SIGNIFICANT CHANGE UP
BUN SERPL-MCNC: 9 MG/DL — SIGNIFICANT CHANGE UP (ref 7–23)
CA-I SERPL-SCNC: 1.22 MMOL/L — SIGNIFICANT CHANGE UP (ref 1.15–1.33)
CALCIUM SERPL-MCNC: 9.5 MG/DL — SIGNIFICANT CHANGE UP (ref 8.4–10.5)
CHLORIDE BLDV-SCNC: 101 MMOL/L — SIGNIFICANT CHANGE UP (ref 96–108)
CHLORIDE SERPL-SCNC: 100 MMOL/L — SIGNIFICANT CHANGE UP (ref 96–108)
CO2 BLDV-SCNC: 28 MMOL/L — HIGH (ref 22–26)
CO2 SERPL-SCNC: 23 MMOL/L — SIGNIFICANT CHANGE UP (ref 22–31)
COLOR SPEC: YELLOW — SIGNIFICANT CHANGE UP
CREAT SERPL-MCNC: 0.71 MG/DL — SIGNIFICANT CHANGE UP (ref 0.5–1.3)
DIFF PNL FLD: ABNORMAL
EGFR: 111 ML/MIN/1.73M2 — SIGNIFICANT CHANGE UP
EOSINOPHIL # BLD AUTO: 0.17 K/UL — SIGNIFICANT CHANGE UP (ref 0–0.5)
EOSINOPHIL NFR BLD AUTO: 1 % — SIGNIFICANT CHANGE UP (ref 0–6)
EPI CELLS # UR: 11 — HIGH
GAS PNL BLDV: 134 MMOL/L — LOW (ref 136–145)
GAS PNL BLDV: SIGNIFICANT CHANGE UP
GAS PNL BLDV: SIGNIFICANT CHANGE UP
GLUCOSE BLDC GLUCOMTR-MCNC: 114 MG/DL — HIGH (ref 70–99)
GLUCOSE BLDV-MCNC: 197 MG/DL — HIGH (ref 70–99)
GLUCOSE SERPL-MCNC: 197 MG/DL — HIGH (ref 70–99)
GLUCOSE UR QL: NEGATIVE — SIGNIFICANT CHANGE UP
HCG SERPL-ACNC: <2 MIU/ML — SIGNIFICANT CHANGE UP
HCO3 BLDV-SCNC: 27 MMOL/L — SIGNIFICANT CHANGE UP (ref 22–29)
HCT VFR BLD CALC: 44.8 % — SIGNIFICANT CHANGE UP (ref 34.5–45)
HCT VFR BLDA CALC: 45 % — SIGNIFICANT CHANGE UP (ref 34.5–46.5)
HGB BLD CALC-MCNC: 14.9 G/DL — SIGNIFICANT CHANGE UP (ref 11.7–16.1)
HGB BLD-MCNC: 15.3 G/DL — SIGNIFICANT CHANGE UP (ref 11.5–15.5)
HYALINE CASTS # UR AUTO: 34 /LPF — HIGH (ref 0–7)
IMM GRANULOCYTES NFR BLD AUTO: 1 % — HIGH (ref 0–0.9)
KETONES UR-MCNC: NEGATIVE — SIGNIFICANT CHANGE UP
LACTATE BLDV-MCNC: 1.4 MMOL/L — SIGNIFICANT CHANGE UP (ref 0.5–2)
LEUKOCYTE ESTERASE UR-ACNC: NEGATIVE — SIGNIFICANT CHANGE UP
LIDOCAIN IGE QN: 18 U/L — SIGNIFICANT CHANGE UP (ref 7–60)
LYMPHOCYTES # BLD AUTO: 1.43 K/UL — SIGNIFICANT CHANGE UP (ref 1–3.3)
LYMPHOCYTES # BLD AUTO: 8.7 % — LOW (ref 13–44)
MAGNESIUM SERPL-MCNC: 1.9 MG/DL — SIGNIFICANT CHANGE UP (ref 1.6–2.6)
MCHC RBC-ENTMCNC: 29.1 PG — SIGNIFICANT CHANGE UP (ref 27–34)
MCHC RBC-ENTMCNC: 34.2 GM/DL — SIGNIFICANT CHANGE UP (ref 32–36)
MCV RBC AUTO: 85.3 FL — SIGNIFICANT CHANGE UP (ref 80–100)
MONOCYTES # BLD AUTO: 0.61 K/UL — SIGNIFICANT CHANGE UP (ref 0–0.9)
MONOCYTES NFR BLD AUTO: 3.7 % — SIGNIFICANT CHANGE UP (ref 2–14)
NEUTROPHILS # BLD AUTO: 13.95 K/UL — HIGH (ref 1.8–7.4)
NEUTROPHILS NFR BLD AUTO: 85.2 % — HIGH (ref 43–77)
NITRITE UR-MCNC: NEGATIVE — SIGNIFICANT CHANGE UP
NRBC # BLD: 0 /100 WBCS — SIGNIFICANT CHANGE UP (ref 0–0)
PCO2 BLDV: 52 MMHG — HIGH (ref 39–42)
PH BLDV: 7.32 — SIGNIFICANT CHANGE UP (ref 7.32–7.43)
PH UR: 6 — SIGNIFICANT CHANGE UP (ref 5–8)
PLATELET # BLD AUTO: 213 K/UL — SIGNIFICANT CHANGE UP (ref 150–400)
PO2 BLDV: 35 MMHG — SIGNIFICANT CHANGE UP (ref 25–45)
POTASSIUM BLDV-SCNC: 3.7 MMOL/L — SIGNIFICANT CHANGE UP (ref 3.5–5.1)
POTASSIUM SERPL-MCNC: 3.5 MMOL/L — SIGNIFICANT CHANGE UP (ref 3.5–5.3)
POTASSIUM SERPL-SCNC: 3.5 MMOL/L — SIGNIFICANT CHANGE UP (ref 3.5–5.3)
PROT SERPL-MCNC: 7.8 G/DL — SIGNIFICANT CHANGE UP (ref 6–8.3)
PROT UR-MCNC: ABNORMAL
RBC # BLD: 5.25 M/UL — HIGH (ref 3.8–5.2)
RBC # FLD: 12.2 % — SIGNIFICANT CHANGE UP (ref 10.3–14.5)
RBC CASTS # UR COMP ASSIST: 1 /HPF — SIGNIFICANT CHANGE UP (ref 0–4)
SAO2 % BLDV: 56.3 % — LOW (ref 67–88)
SODIUM SERPL-SCNC: 137 MMOL/L — SIGNIFICANT CHANGE UP (ref 135–145)
SP GR SPEC: 1.02 — SIGNIFICANT CHANGE UP (ref 1.01–1.02)
UROBILINOGEN FLD QL: NEGATIVE — SIGNIFICANT CHANGE UP
WBC # BLD: 16.39 K/UL — HIGH (ref 3.8–10.5)
WBC # FLD AUTO: 16.39 K/UL — HIGH (ref 3.8–10.5)
WBC UR QL: 2 /HPF — SIGNIFICANT CHANGE UP (ref 0–5)

## 2023-06-06 PROCEDURE — 76700 US EXAM ABDOM COMPLETE: CPT | Mod: 26

## 2023-06-06 PROCEDURE — 99285 EMERGENCY DEPT VISIT HI MDM: CPT

## 2023-06-06 PROCEDURE — 99223 1ST HOSP IP/OBS HIGH 75: CPT

## 2023-06-06 RX ORDER — ACETAMINOPHEN 500 MG
1000 TABLET ORAL ONCE
Refills: 0 | Status: COMPLETED | OUTPATIENT
Start: 2023-06-06 | End: 2023-06-06

## 2023-06-06 RX ORDER — DEXTROSE 50 % IN WATER 50 %
15 SYRINGE (ML) INTRAVENOUS ONCE
Refills: 0 | Status: DISCONTINUED | OUTPATIENT
Start: 2023-06-06 | End: 2023-06-08

## 2023-06-06 RX ORDER — SODIUM CHLORIDE 9 MG/ML
1000 INJECTION, SOLUTION INTRAVENOUS
Refills: 0 | Status: DISCONTINUED | OUTPATIENT
Start: 2023-06-06 | End: 2023-06-08

## 2023-06-06 RX ORDER — SODIUM CHLORIDE 9 MG/ML
1000 INJECTION INTRAMUSCULAR; INTRAVENOUS; SUBCUTANEOUS ONCE
Refills: 0 | Status: COMPLETED | OUTPATIENT
Start: 2023-06-06 | End: 2023-06-06

## 2023-06-06 RX ORDER — DEXTROSE 50 % IN WATER 50 %
12.5 SYRINGE (ML) INTRAVENOUS ONCE
Refills: 0 | Status: DISCONTINUED | OUTPATIENT
Start: 2023-06-06 | End: 2023-06-08

## 2023-06-06 RX ORDER — DEXTROSE 50 % IN WATER 50 %
25 SYRINGE (ML) INTRAVENOUS ONCE
Refills: 0 | Status: DISCONTINUED | OUTPATIENT
Start: 2023-06-06 | End: 2023-06-08

## 2023-06-06 RX ORDER — ACETAMINOPHEN 500 MG
650 TABLET ORAL ONCE
Refills: 0 | Status: COMPLETED | OUTPATIENT
Start: 2023-06-06 | End: 2023-06-06

## 2023-06-06 RX ORDER — FAMOTIDINE 10 MG/ML
20 INJECTION INTRAVENOUS ONCE
Refills: 0 | Status: COMPLETED | OUTPATIENT
Start: 2023-06-06 | End: 2023-06-06

## 2023-06-06 RX ORDER — ONDANSETRON 8 MG/1
4 TABLET, FILM COATED ORAL EVERY 8 HOURS
Refills: 0 | Status: DISCONTINUED | OUTPATIENT
Start: 2023-06-06 | End: 2023-06-08

## 2023-06-06 RX ORDER — GLUCAGON INJECTION, SOLUTION 0.5 MG/.1ML
1 INJECTION, SOLUTION SUBCUTANEOUS ONCE
Refills: 0 | Status: DISCONTINUED | OUTPATIENT
Start: 2023-06-06 | End: 2023-06-08

## 2023-06-06 RX ORDER — ONDANSETRON 8 MG/1
4 TABLET, FILM COATED ORAL ONCE
Refills: 0 | Status: COMPLETED | OUTPATIENT
Start: 2023-06-06 | End: 2023-06-06

## 2023-06-06 RX ORDER — INSULIN LISPRO 100/ML
VIAL (ML) SUBCUTANEOUS
Refills: 0 | Status: DISCONTINUED | OUTPATIENT
Start: 2023-06-06 | End: 2023-06-08

## 2023-06-06 RX ADMIN — Medication 400 MILLIGRAM(S): at 09:29

## 2023-06-06 RX ADMIN — SODIUM CHLORIDE 1000 MILLILITER(S): 9 INJECTION INTRAMUSCULAR; INTRAVENOUS; SUBCUTANEOUS at 10:41

## 2023-06-06 RX ADMIN — FAMOTIDINE 20 MILLIGRAM(S): 10 INJECTION INTRAVENOUS at 09:29

## 2023-06-06 RX ADMIN — ONDANSETRON 4 MILLIGRAM(S): 8 TABLET, FILM COATED ORAL at 09:29

## 2023-06-06 RX ADMIN — SODIUM CHLORIDE 1000 MILLILITER(S): 9 INJECTION INTRAMUSCULAR; INTRAVENOUS; SUBCUTANEOUS at 09:29

## 2023-06-06 RX ADMIN — Medication 650 MILLIGRAM(S): at 22:02

## 2023-06-06 NOTE — ED PROVIDER NOTE - OBJECTIVE STATEMENT
37 yo F with a PMH of DM, asthma p/w abd pain, diarrhea and chills since Saturday. Reports >8-10 episodes of watery diarrhea/day. Non-bloody. Endorsing diffuse abd pain described as cramping, worse in her epigastric area. Called her PMD who recommended she come to ED for eval to r/o pancreatitis. Pt is on Ozempic, has been on it for 3mo, last dose adjustment was 2 wks ago. Pt traveled to [facility] 3 wks ago, was feeling fine when she returned home. Denies nausea, vomiting, fever, chest pain, SOB, cough, abd distension, urinary complaints, headache, dizziness.

## 2023-06-06 NOTE — ED PROVIDER NOTE - PHYSICAL EXAMINATION
CONSTITUTIONAL: Well appearing and in no apparent distress.  ENT: Airway patent, dry mucous membranes.   EYES: Pupils equal, round and reactive to light. EOMI. Conjunctiva normal appearing.   CARDIAC: Tachycardic, regular rhythm.  Heart sounds S1, S2.    RESPIRATORY: Breath sounds clear and equal bilaterally.   GASTROINTESTINAL: Abdomen soft, epigastric TTP, not distended. No rebound or guarding.   MUSCULOSKELETAL: Spine appears normal.  NEUROLOGICAL: Alert and oriented x3, no focal deficits, no motor or sensory deficits. 5/5 muscle strength throughout.  SKIN: Skin normal color, warm, dry and intact.   PSYCHIATRIC: Normal mood and affect.

## 2023-06-06 NOTE — ED ADULT NURSE REASSESSMENT NOTE - NS ED NURSE REASSESS COMMENT FT1
1046 pt noted eating w/ offered cookies and water, pmd made aware, pt tolerating food at this time, pending u/s no nausea and vomiting noted/

## 2023-06-06 NOTE — H&P ADULT - PROBLEM SELECTOR PLAN 2
hold ozempic   if symptoms resolved consider restarting at lower, better tolerated dose as outpatient  PAIGE, diabetic diet

## 2023-06-06 NOTE — H&P ADULT - NSHPPHYSICALEXAM_GEN_ALL_CORE
Vital Signs Last 24 Hrs  T(C): 36.6 (06 Jun 2023 08:10), Max: 36.6 (06 Jun 2023 08:10)  T(F): 97.8 (06 Jun 2023 08:10), Max: 97.8 (06 Jun 2023 08:10)  HR: 108 (06 Jun 2023 08:10) (108 - 108)  BP: 128/86 (06 Jun 2023 08:10) (128/86 - 128/86)  BP(mean): --  RR: 20 (06 Jun 2023 08:10) (20 - 20)  SpO2: 96% (06 Jun 2023 08:10) (96% - 96%)    Parameters below as of 06 Jun 2023 08:10  Patient On (Oxygen Delivery Method): room air    CONSTITUTIONAL: Well-groomed, in no apparent distress, comfortable  EYES: No conjunctival or scleral injection, non-icteric;   ENMT: No external nasal lesions; nasal mucosa not inflamed; normal dentition; no pharyngeal injection or exudates, oral mucosa with moist membranes  NECK: Trachea midline without palpable neck mass; thyroid not enlarged and non-tender, no JVD  RESPIRATORY: Breathing comfortably; lungs CTA without wheeze/rhonchi/rales  CARDIOVASCULAR: RRR, no murmurs; pedal pulses full and symmetric; no lower extremity edema  GASTROINTESTINAL: No palpable masses or tenderness, +BS throughout, nondistended  MUSCULOSKELETAL: no digital clubbing or cyanosis; no paraspinal tenderness; normal strength and tone of extremities  SKIN: No rashes or ulcers noted; no subcutaneous nodules or induration palpable  NEUROLOGIC: CN II-XII intact; sensation intact in LEs b/l to light touch  PSYCHIATRIC: A+O x 3; mood and affect appropriate; appropriate insight and judgment

## 2023-06-06 NOTE — H&P ADULT - NSHPSOCIALHISTORY_GEN_ALL_CORE
Social History:      Lives with: ( x ) alone  (  ) children   (  ) spouse   (  ) parents  (  ) other    Substance Use (street drugs): ( x ) never used  (  ) other:  Tobacco Usage:  (  x ) never smoked   (   ) former smoker   (   ) current smoker  (     ) pack year  (        ) last cigarette date  Alcohol Usage: none  Denies any illicit drug use.

## 2023-06-06 NOTE — H&P ADULT - PROBLEM SELECTOR PLAN 3
as above    per pt she has known transaminitis relating to known fatty liver disease  review outpatient records    f/u MRCP to r/o biliary obstruction or other hepatic cause  f/u GI

## 2023-06-06 NOTE — CONSULT NOTE ADULT - NS ATTEND AMEND GEN_ALL_CORE FT
38 y F adm with abd pain and watery diarrhea , coinciding with start of Ozempic for DM control  Mild elevation of ALT AST likely from NAFLD  US abd no gall stones or AP  agree with plan of advance diet as tolerated  PPIs if needed

## 2023-06-06 NOTE — H&P ADULT - NSHPLABSRESULTS_GEN_ALL_CORE
Labs personally reviewed:                          15.3   16.39 )-----------( 213      ( 06 Jun 2023 09:17 )             44.8       06-06    137  |  100  |  9   ----------------------------<  197<H>  3.5   |  23  |  0.71    Ca    9.5      06 Jun 2023 09:17  Mg     1.9     06-06    TPro  7.8  /  Alb  4.4  /  TBili  0.6  /  DBili  x   /  AST  41<H>  /  ALT  53<H>  /  AlkPhos  150<H>  06-06      Imaging personally reviewed: MRCP p    < from: US Abdomen Complete (US Abdomen Complete .) (06.06.23 @ 12:21) >      IMPRESSION:  No cholelithiasis.    Hepatic stenosis.    --- End of Report ---      < end of copied text >      EKG personally reviewed:

## 2023-06-06 NOTE — ED ADULT NURSE NOTE - OBJECTIVE STATEMENT
0914 pt 38yf aox4 ambulatory states on ozempic x 3mos, inc dosage c/o abd pain diarrhea nausea, pt able to verbalize concerns, family at bedside pending eval

## 2023-06-06 NOTE — H&P ADULT - NSHPREVIEWOFSYSTEMS_GEN_ALL_CORE
REVIEW OF SYSTEMS:    CONSTITUTIONAL: +weakness assoc w diarrheal episodes  EYES: no blurry vision or eye pain.   ENT: No throat pain. No dysphagia.    NECK: No pain or stiffness  RESPIRATORY: No cough, wheezing, hemoptysis; No shortness of breath  CARDIOVASCULAR: No chest pain or palpitations.  GASTROINTESTINAL: as above  GENITOURINARY: No dysuria, frequency or hematuria  NEUROLOGICAL: No numbness or weakness. No dizziness or falls.   SKIN: No itching, burning, rashes, or lesions.   All other review of systems is negative unless indicated above.

## 2023-06-06 NOTE — H&P ADULT - PROBLEM SELECTOR PLAN 1
watery profuse diarrhea    ddx includes recent uptitration of Ozempic GLP-1 dose, gastroenteritis, less likely parasitic infection from recent [place] vacation    IV hydration as needed   check GI PCR and o&p    given concomitant LFT elevation - MRCP pending, apprec GI recs

## 2023-06-06 NOTE — ED PROVIDER NOTE - ATTENDING APP SHARED VISIT CONTRIBUTION OF CARE
I, Mario Joyce, performed a history and physical exam of the patient and discussed their management with the resident and/or advanced care provider. I reviewed the resident and/or advanced care provider's note and agree with the documented findings and plan of care. I was present and available for all procedures.    37 yo F with a PMH of DM, asthma p/w abd pain, diarrhea and chills since Saturday. Reports >8-10 episodes of watery diarrhea/day. Non-bloody. Endorsing diffuse abd pain described as cramping, worse in her epigastric area. Called her PMD who recommended she come to ED for eval to r/o pancreatitis. Pt is on Ozempic, has been on it for 3mo, last dose adjustment was 2 wks ago. Pt traveled to [facility] 3 wks ago, was feeling fine when she returned home. Denies nausea, vomiting, fever, chest pain, SOB, cough, abd distension, urinary complaints, headache, dizziness.    Well appearing and in NAD, head normal appearing atraumatic, trachea midline, no respiratory distress, lungs cta bilaterally, rrr no murmurs, soft  mild diffuse ttp mostly in epigastric region ND abdomen no cva ttp , no visible extremity deformities, Alert and oriented, non focal neuro exam, skin warm and dry, normal affect and mood, no leg swelling, calf ttp or jvd    Concern for DKA versus pancreatitis secondary to Ozempic use versus biliary colic will evaluate with screening labs possible right upper quadrant ultrasounds as well as pain medication IV fluid hydration urine analysis reassess for further imaging discussed with patient agreeable with plan unlikely ACS PE pneumothorax dissection AAA pneumonia

## 2023-06-06 NOTE — CONSULT NOTE ADULT - ASSESSMENT
37 yo F with a PMH of DM, asthma p/w abd pain, diarrhea - increased symptoms since weekend but reported onset coinciding with start of Ozempic 3 months ago. Found to have elevated LFTs and prominent CBD (6mm) on US, negative lipase but +LUQ pain    #abdominal Pain - suspect 2/2 Ozempic, but given dilated CBD need to r/o CBD sludge/stones, pancreatitis (though normal lipase)  #elevated LFTs - suspect 2/2 fatty liver, but given dilated CBD need to r/o CBD sludge/stones  #Dilated CBD- r/o CBD sludge/stones  #Diarrhea - suspect 2/2 Ozempic    RECS:  -check MRCP  -If MRCP without acute CBD pathology then can advance diet and dc home  -defer to Medicine re: DM management  -check CBC and CMP in AM    Discussed with pt and mother in detail all questions answered  Discussed with Dr Forbes  >40 minutes spent in direct pt care    Bharathi Salazar PA-C    [facility]  [phone]  Available on TEAMS Mon-Fri 8a-4p  After hours and weekend coverage [phone]
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
## 2023-06-06 NOTE — H&P ADULT - HISTORY OF PRESENT ILLNESS
38F w DM on Ozempic, fatty liver, obesity p/w ~1 week of diarrhea. Symptoms started with loose stools for a few days but ramped up to >10 episodes per day of watery yellow liquid immediately after she ate any food, nonbloody. Early on associated with N/V and w/mild abdominal discomfort. No CP or SOB. Notably 2 weeks ago pt had her Ozempic dose uptitrated. She returned from [place] 1 month ago. No sick contacts, she did eat sushi last week but no one else developed GI symptoms.

## 2023-06-06 NOTE — H&P ADULT - NSICDXPASTMEDICALHX_GEN_ALL_CORE_FT
PAST MEDICAL HISTORY:  Asthma last inhaler use in 2016    Bimalleolar fracture of right ankle     Diabetes     Hepatic steatosis     Obesity

## 2023-06-06 NOTE — ED PROVIDER NOTE - PROGRESS NOTE DETAILS
patient feeling relief cont maint fluids as well as pending ruq sono GI contacted, spoke with Dr. Welch who will eval pt  Ac Hidalgo PA-C

## 2023-06-06 NOTE — ED PROVIDER NOTE - NSICDXPASTSURGICALHX_GEN_ALL_CORE_FT
PAST SURGICAL HISTORY:  H/O benign breast biopsy - Left, 2014    S/P ORIF (open reduction internal fixation) fracture Right ankle, 11/2016    S/P rhinoplasty 2012

## 2023-06-06 NOTE — PATIENT PROFILE ADULT - FALL HARM RISK - UNIVERSAL INTERVENTIONS
Bed in lowest position, wheels locked, appropriate side rails in place/Call bell, personal items and telephone in reach/Instruct patient to call for assistance before getting out of bed or chair/Non-slip footwear when patient is out of bed/[place] to call system/Physically safe environment - no spills, clutter or unnecessary equipment/Purposeful Proactive Rounding/Room/bathroom lighting operational, light cord in reach

## 2023-06-06 NOTE — CONSULT NOTE ADULT - SUBJECTIVE AND OBJECTIVE BOX
Patient is a 38y old  Female who presents with a chief complaint of     HPI:  39 yo F with a PMH of DM, asthma p/w abd pain, diarrhea and chills since Saturday. Reports >8-10 episodes of watery diarrhea/day. Non-bloody. Endorsing diffuse abd pain described as cramping, worse in her epigastric area. Called her PMD who recommended she come to ED for eval to r/o pancreatitis. Pt is on Ozempic, has been on it for 3mo, last dose adjustment was 2 wks ago. Pt traveled to [facility] 3 wks ago, was feeling fine when she returned home. Denies nausea, vomiting, fever, chest pain, SOB, cough, abd distension, urinary complaints, headache, dizziness.    Reports diarrhea since starting Ozempic but increased this past weekend - stools described as watery  no further diarrhea since arrival to ED  last dose Ozempic last Thursday  Brother also on Ozempic - similar symptoms    no pale stools, no rash  no rectal bleeding  feeling better after 2L IV hydration - pain localized to LUQ    tolerating PO watery  reported "fatty liver" x 2 years    PAST MEDICAL & SURGICAL HISTORY:  Asthma  last inhaler use in       Bimalleolar fracture of right ankle      Obesity      Diabetes      S/P rhinoplasty        H/O benign breast biopsy  - Left, 2014      S/P ORIF (open reduction internal fixation) fracture  Right ankle, 2016          Allergies  No Known Allergies    MEDICATIONS  (STANDING):  Home meds list reviewed    MEDICATIONS  (PRN):      Social History:  no tobacco, illicit drug use        Family History   IBD (  ) Yes   (  ) No  GI Malignancy (  )  Yes    (  ) No    Health Management  Last Colonoscopy -      Advanced Directives: (   X  ) None    (      ) DNR    (     ) DNI    (     ) Health Care Proxy:     Review of Systems:  see HPI- remainder 10 point ROS negative      Vital Signs Last 24 Hrs  T(C): 36.6 (2023 08:10), Max: 36.6 (2023 08:10)  T(F): 97.8 (2023 08:10), Max: 97.8 (2023 08:10)  HR: 108 (2023 08:10) (108 - 108)  BP: 128/86 (2023 08:10) (128/86 - 128/86)  BP(mean): --  RR: 20 (2023 08:10) (20 - 20)  SpO2: 96% (2023 08:10) (96% - 96%)    Parameters below as of 2023 08:10  Patient On (Oxygen Delivery Method): room air        PHYSICAL EXAM:    Constitutional: NAD, well-developed obese Spanish female  mother at bedside. sitting up in ED stretcher, looks comfortable  Neck: No LAD, supple no JVD  Respiratory: Clear b/l no accessory muscle use  Cardiovascular: S1 and S2, RRR  Gastrointestinal: BS+, obese soft ND +LUQ tenderness no rebound or rigidity negative Jade's sign  Extremities: No peripheral edema, neg clubbing, cyanosis  WH surgical scar Right ankle  Vascular: 2+ peripheral pulses  Neurological: A/O x 3, no focal deficits  Psychiatric: Normal mood, normal affect  Skin: No rashes, anicteric        LABS:                        15.3   16.39 )-----------( 213      ( 2023 09:17 )             44.8     06-    137  |  100  |  9   ----------------------------<  197<H>  3.5   |  23  |  0.71    Ca    9.5      2023 09:17  Mg     1.9     -    TPro  7.8  /  Alb  4.4  /  TBili  0.6  /  DBili  x   /  AST  41<H>  /  ALT  53<H>  /  AlkPhos  150<H>  -    Lipase, Serum: 18 U/L (23 @ 09:17)     HCG Quantitative, Serum (23 @ 09:17)   HCG Quantitative, Serum: <2.0    Urinalysis Basic - ( 2023 09:55 )    Color: Yellow / Appearance: Slightly Turbid / S.024 / pH: x  Gluc: x / Ketone: Negative  / Bili: Negative / Urobili: Negative   Blood: x / Protein: 30 mg/dL / Nitrite: Negative   Leuk Esterase: Negative / RBC: 1 /hpf / WBC 2 /HPF   Sq Epi: x / Non Sq Epi: x / Bacteria: Many          RADIOLOGY & ADDITIONAL TESTS:    ACC: [id number] EXAM:  US ABDOMEN COMPLETE   ORDERED BY: BRAD ANGUIANO     PROCEDURE DATE:  2023          INTERPRETATION:  CLINICAL INFORMATION: Upper quadrant pain, evaluate for   biliary colic.    COMPARISON: Ultrasound abdomen 2018.    TECHNIQUE: Sonography of the abdomen. Technically limited study secondary   to patient's body habitus.    FINDINGS:  Liver: Measuring 17.7 cm, increased echogenicity. Smooth contour.  Bile ducts: Normal caliber. Common bile duct measures 6 mm.  Gallbladder: Gallbladder fold, otherwise within normal limits.. No   cholelithiasis, pericholecystic fluid or gallbladder wall thickening.  Pancreas: Limited visualization however visualized portions are within   normal limits.  Spleen: 11.8 cm. Within normal limits.  Right kidney: 11.9 cm. No hydronephrosis.  Left kidney: 12.2 cm. No hydronephrosis.  Ascites: None.  Aorta and IVC: Visualized portions are within normal limits.    IMPRESSION:  No cholelithiasis.    Hepatic stenosis.    --- End of Report ---

## 2023-06-07 ENCOUNTER — TRANSCRIPTION ENCOUNTER (OUTPATIENT)
Age: 38
End: 2023-06-07

## 2023-06-07 LAB
A1C WITH ESTIMATED AVERAGE GLUCOSE RESULT: 6.7 % — HIGH (ref 4–5.6)
ALBUMIN SERPL ELPH-MCNC: 4 G/DL — SIGNIFICANT CHANGE UP (ref 3.3–5)
ALP SERPL-CCNC: 136 U/L — HIGH (ref 40–120)
ALT FLD-CCNC: 46 U/L — HIGH (ref 10–45)
ANION GAP SERPL CALC-SCNC: 15 MMOL/L — SIGNIFICANT CHANGE UP (ref 5–17)
AST SERPL-CCNC: 32 U/L — SIGNIFICANT CHANGE UP (ref 10–40)
BASOPHILS # BLD AUTO: 0.06 K/UL — SIGNIFICANT CHANGE UP (ref 0–0.2)
BASOPHILS NFR BLD AUTO: 0.5 % — SIGNIFICANT CHANGE UP (ref 0–2)
BILIRUB SERPL-MCNC: 0.8 MG/DL — SIGNIFICANT CHANGE UP (ref 0.2–1.2)
BUN SERPL-MCNC: 6 MG/DL — LOW (ref 7–23)
CALCIUM SERPL-MCNC: 9.1 MG/DL — SIGNIFICANT CHANGE UP (ref 8.4–10.5)
CHLORIDE SERPL-SCNC: 102 MMOL/L — SIGNIFICANT CHANGE UP (ref 96–108)
CHOLEST SERPL-MCNC: 169 MG/DL — SIGNIFICANT CHANGE UP
CO2 SERPL-SCNC: 21 MMOL/L — LOW (ref 22–31)
CREAT SERPL-MCNC: 0.58 MG/DL — SIGNIFICANT CHANGE UP (ref 0.5–1.3)
CULTURE RESULTS: SIGNIFICANT CHANGE UP
EGFR: 118 ML/MIN/1.73M2 — SIGNIFICANT CHANGE UP
EOSINOPHIL # BLD AUTO: 0.44 K/UL — SIGNIFICANT CHANGE UP (ref 0–0.5)
EOSINOPHIL NFR BLD AUTO: 3.5 % — SIGNIFICANT CHANGE UP (ref 0–6)
ESTIMATED AVERAGE GLUCOSE: 146 MG/DL — HIGH (ref 68–114)
GI PCR PANEL: SIGNIFICANT CHANGE UP
GLUCOSE BLDC GLUCOMTR-MCNC: 103 MG/DL — HIGH (ref 70–99)
GLUCOSE BLDC GLUCOMTR-MCNC: 121 MG/DL — HIGH (ref 70–99)
GLUCOSE BLDC GLUCOMTR-MCNC: 124 MG/DL — HIGH (ref 70–99)
GLUCOSE SERPL-MCNC: 136 MG/DL — HIGH (ref 70–99)
HCT VFR BLD CALC: 41 % — SIGNIFICANT CHANGE UP (ref 34.5–45)
HDLC SERPL-MCNC: 30 MG/DL — LOW
HGB BLD-MCNC: 14.2 G/DL — SIGNIFICANT CHANGE UP (ref 11.5–15.5)
IMM GRANULOCYTES NFR BLD AUTO: 1 % — HIGH (ref 0–0.9)
LIPID PNL WITH DIRECT LDL SERPL: 111 MG/DL — HIGH
LYMPHOCYTES # BLD AUTO: 1.89 K/UL — SIGNIFICANT CHANGE UP (ref 1–3.3)
LYMPHOCYTES # BLD AUTO: 15.2 % — SIGNIFICANT CHANGE UP (ref 13–44)
MCHC RBC-ENTMCNC: 29.3 PG — SIGNIFICANT CHANGE UP (ref 27–34)
MCHC RBC-ENTMCNC: 34.6 GM/DL — SIGNIFICANT CHANGE UP (ref 32–36)
MCV RBC AUTO: 84.5 FL — SIGNIFICANT CHANGE UP (ref 80–100)
MONOCYTES # BLD AUTO: 0.83 K/UL — SIGNIFICANT CHANGE UP (ref 0–0.9)
MONOCYTES NFR BLD AUTO: 6.7 % — SIGNIFICANT CHANGE UP (ref 2–14)
NEUTROPHILS # BLD AUTO: 9.07 K/UL — HIGH (ref 1.8–7.4)
NEUTROPHILS NFR BLD AUTO: 73.1 % — SIGNIFICANT CHANGE UP (ref 43–77)
NON HDL CHOLESTEROL: 139 MG/DL — HIGH
NRBC # BLD: 0 /100 WBCS — SIGNIFICANT CHANGE UP (ref 0–0)
PLATELET # BLD AUTO: 204 K/UL — SIGNIFICANT CHANGE UP (ref 150–400)
POTASSIUM SERPL-MCNC: 3.4 MMOL/L — LOW (ref 3.5–5.3)
POTASSIUM SERPL-SCNC: 3.4 MMOL/L — LOW (ref 3.5–5.3)
PROT SERPL-MCNC: 7.3 G/DL — SIGNIFICANT CHANGE UP (ref 6–8.3)
RBC # BLD: 4.85 M/UL — SIGNIFICANT CHANGE UP (ref 3.8–5.2)
RBC # FLD: 12 % — SIGNIFICANT CHANGE UP (ref 10.3–14.5)
SODIUM SERPL-SCNC: 138 MMOL/L — SIGNIFICANT CHANGE UP (ref 135–145)
SPECIMEN SOURCE: SIGNIFICANT CHANGE UP
TRIGL SERPL-MCNC: 139 MG/DL — SIGNIFICANT CHANGE UP
WBC # BLD: 12.41 K/UL — HIGH (ref 3.8–10.5)
WBC # FLD AUTO: 12.41 K/UL — HIGH (ref 3.8–10.5)

## 2023-06-07 PROCEDURE — 74183 MRI ABD W/O CNTR FLWD CNTR: CPT | Mod: 26

## 2023-06-07 RX ORDER — ACETAMINOPHEN 500 MG
1000 TABLET ORAL ONCE
Refills: 0 | Status: COMPLETED | OUTPATIENT
Start: 2023-06-07 | End: 2023-06-07

## 2023-06-07 RX ORDER — POTASSIUM CHLORIDE 20 MEQ
40 PACKET (EA) ORAL ONCE
Refills: 0 | Status: COMPLETED | OUTPATIENT
Start: 2023-06-07 | End: 2023-06-07

## 2023-06-07 RX ORDER — SEMAGLUTIDE 0.68 MG/ML
1 INJECTION, SOLUTION SUBCUTANEOUS
Refills: 0 | DISCHARGE

## 2023-06-07 RX ADMIN — Medication 1000 MILLIGRAM(S): at 22:35

## 2023-06-07 RX ADMIN — Medication 40 MILLIEQUIVALENT(S): at 13:32

## 2023-06-07 RX ADMIN — Medication 1000 MILLIGRAM(S): at 22:00

## 2023-06-07 NOTE — PROGRESS NOTE ADULT - SUBJECTIVE AND OBJECTIVE BOX
PROGRESS NOTE:   Authored by: Diego Ponce M.D.   Internal Medicine PGY-1  Please Contact Via Teams    Patient is a 38y old  Female who presents with a chief complaint of diarrhea (2023 16:04)      SUBJECTIVE / OVERNIGHT EVENTS:  No acute events overnight.     Tele:    Brief Daily Plan:    MEDICATIONS  (STANDING):  dextrose 5%. 1000 milliLiter(s) (50 mL/Hr) IV Continuous <Continuous>  dextrose 5%. 1000 milliLiter(s) (100 mL/Hr) IV Continuous <Continuous>  dextrose 50% Injectable 25 Gram(s) IV Push once  dextrose 50% Injectable 25 Gram(s) IV Push once  dextrose 50% Injectable 12.5 Gram(s) IV Push once  glucagon  Injectable 1 milliGRAM(s) IntraMuscular once  insulin lispro (ADMELOG) corrective regimen sliding scale   SubCutaneous three times a day before meals    MEDICATIONS  (PRN):  dextrose Oral Gel 15 Gram(s) Oral once PRN Blood Glucose LESS THAN 70 milliGRAM(s)/deciliter  ondansetron Injectable 4 milliGRAM(s) IV Push every 8 hours PRN Nausea and/or Vomiting      CAPILLARY BLOOD GLUCOSE      POCT Blood Glucose.: 114 mg/dL (2023 23:23)    I&O's Summary      PHYSICAL EXAM:  Vital Signs Last 24 Hrs  T(C): 36.9 (2023 23:29), Max: 36.9 (2023 22:02)  T(F): 98.4 (2023 23:29), Max: 98.4 (2023 22:02)  HR: 96 (2023 23:29) (75 - 108)  BP: 107/96 (2023 23:29) (107/96 - 132/86)  BP(mean): --  RR: 18 (2023 23:29) (18 - 20)  SpO2: 98% (2023 23:29) (96% - 99%)    Parameters below as of 2023 23:29  Patient On (Oxygen Delivery Method): room air        CONSTITUTIONAL: NAD, well-developed  RESPIRATORY: Normal respiratory effort; lungs are clear to auscultation bilaterally  CARDIOVASCULAR: Regular rate and rhythm, normal S1 and S2, no murmur/rub/gallop; No lower extremity edema; Peripheral pulses are 2+ bilaterally  ABDOMEN: Nontender to palpation, normoactive bowel sounds, no rebound/guarding; No hepatosplenomegaly  MUSCLOSKELETAL: no clubbing or cyanosis of digits; no joint swelling or tenderness to palpation  PSYCH: A+O to person, place, and time; affect appropriate    LABS:                        15.3   16.39 )-----------( 213      ( 2023 09:17 )             44.8         137  |  100  |  9   ----------------------------<  197<H>  3.5   |  23  |  0.71    Ca    9.5      2023 09:17  Mg     1.9         TPro  7.8  /  Alb  4.4  /  TBili  0.6  /  DBili  x   /  AST  41<H>  /  ALT  53<H>  /  AlkPhos  150<H>              MICRO:  Urinalysis Basic - ( 2023 09:55 )    Color: Yellow / Appearance: Slightly Turbid / S.024 / pH: x  Gluc: x / Ketone: Negative  / Bili: Negative / Urobili: Negative   Blood: x / Protein: 30 mg/dL / Nitrite: Negative   Leuk Esterase: Negative / RBC: 1 /hpf / WBC 2 /HPF   Sq Epi: x / Non Sq Epi: x / Bacteria: Many          IMAGING: PROGRESS NOTE:   Authored by: Diego Ponce M.D.   Internal Medicine PGY-1  Please Contact Via Teams    Patient is a 38y old  Female who presents with a chief complaint of diarrhea (2023 16:04)      SUBJECTIVE / OVERNIGHT EVENTS:  No acute events overnight. Patient feeling well this morning, only 1 loose BM last night.    Brief Daily Plan:  - For MRCP today 8pm    MEDICATIONS  (STANDING):  dextrose 5%. 1000 milliLiter(s) (50 mL/Hr) IV Continuous <Continuous>  dextrose 5%. 1000 milliLiter(s) (100 mL/Hr) IV Continuous <Continuous>  dextrose 50% Injectable 25 Gram(s) IV Push once  dextrose 50% Injectable 25 Gram(s) IV Push once  dextrose 50% Injectable 12.5 Gram(s) IV Push once  glucagon  Injectable 1 milliGRAM(s) IntraMuscular once  insulin lispro (ADMELOG) corrective regimen sliding scale   SubCutaneous three times a day before meals    MEDICATIONS  (PRN):  dextrose Oral Gel 15 Gram(s) Oral once PRN Blood Glucose LESS THAN 70 milliGRAM(s)/deciliter  ondansetron Injectable 4 milliGRAM(s) IV Push every 8 hours PRN Nausea and/or Vomiting      CAPILLARY BLOOD GLUCOSE      POCT Blood Glucose.: 114 mg/dL (2023 23:23)    I&O's Summary      PHYSICAL EXAM:  Vital Signs Last 24 Hrs  T(C): 36.9 (2023 23:29), Max: 36.9 (2023 22:02)  T(F): 98.4 (2023 23:29), Max: 98.4 (2023 22:02)  HR: 96 (2023 23:29) (75 - 108)  BP: 107/96 (2023 23:29) (107/96 - 132/86)  BP(mean): --  RR: 18 (2023 23:29) (18 - 20)  SpO2: 98% (2023 23:29) (96% - 99%)    Parameters below as of 2023 23:29  Patient On (Oxygen Delivery Method): room air        CONSTITUTIONAL: NAD, well-developed, obese  RESPIRATORY: Normal respiratory effort; lungs are clear to auscultation bilaterally  CARDIOVASCULAR: Regular rate and rhythm, normal S1 and S2, no murmurs  ABDOMEN: Large, soft, LUQ tenderness  PSYCH: A+O to person, place, and time; affect appropriate    LABS:                        15.3   16.39 )-----------( 213      ( 2023 09:17 )             44.8         137  |  100  |  9   ----------------------------<  197<H>  3.5   |  23  |  0.71    Ca    9.5      2023 09:17  Mg     1.9         TPro  7.8  /  Alb  4.4  /  TBili  0.6  /  DBili  x   /  AST  41<H>  /  ALT  53<H>  /  AlkPhos  150<H>              MICRO:  Urinalysis Basic - ( 2023 09:55 )    Color: Yellow / Appearance: Slightly Turbid / S.024 / pH: x  Gluc: x / Ketone: Negative  / Bili: Negative / Urobili: Negative   Blood: x / Protein: 30 mg/dL / Nitrite: Negative   Leuk Esterase: Negative / RBC: 1 /hpf / WBC 2 /HPF   Sq Epi: x / Non Sq Epi: x / Bacteria: Many       PROGRESS NOTE:   Authored by: Diego Ponce M.D.   Internal Medicine PGY-1  Please Contact Via Teams    Patient is a 38y old  Female who presents with a chief complaint of diarrhea (2023 16:04)      SUBJECTIVE / OVERNIGHT EVENTS:  No acute events overnight. Patient feeling well this morning, only 1 loose BM last night.    Brief Daily Plan:  - For MRCP today 8pm    MEDICATIONS  (STANDING):  dextrose 5%. 1000 milliLiter(s) (50 mL/Hr) IV Continuous <Continuous>  dextrose 5%. 1000 milliLiter(s) (100 mL/Hr) IV Continuous <Continuous>  dextrose 50% Injectable 25 Gram(s) IV Push once  dextrose 50% Injectable 25 Gram(s) IV Push once  dextrose 50% Injectable 12.5 Gram(s) IV Push once  glucagon  Injectable 1 milliGRAM(s) IntraMuscular once  insulin lispro (ADMELOG) corrective regimen sliding scale   SubCutaneous three times a day before meals    MEDICATIONS  (PRN):  dextrose Oral Gel 15 Gram(s) Oral once PRN Blood Glucose LESS THAN 70 milliGRAM(s)/deciliter  ondansetron Injectable 4 milliGRAM(s) IV Push every 8 hours PRN Nausea and/or Vomiting      CAPILLARY BLOOD GLUCOSE      POCT Blood Glucose.: 114 mg/dL (2023 23:23)    I&O's Summary      PHYSICAL EXAM:  Vital Signs Last 24 Hrs  T(C): 36.9 (2023 23:29), Max: 36.9 (2023 22:02)  T(F): 98.4 (2023 23:29), Max: 98.4 (2023 22:02)  HR: 96 (2023 23:29) (75 - 108)  BP: 107/96 (2023 23:29) (107/96 - 132/86)  BP(mean): --  RR: 18 (2023 23:29) (18 - 20)  SpO2: 98% (2023 23:29) (96% - 99%)    Parameters below as of 2023 23:29  Patient On (Oxygen Delivery Method): room air        CONSTITUTIONAL: NAD, well-developed, obese  RESPIRATORY: Normal respiratory effort; lungs are clear to auscultation bilaterally  CARDIOVASCULAR: Regular rate and rhythm, normal S1 and S2, no murmurs  ABDOMEN: Large, soft, LUQ tenderness - mild. no rebound or gaurding  PSYCH: A+O to person, place, and time; affect appropriate    LABS:                        15.3   16.39 )-----------( 213      ( 2023 09:17 )             44.8         137  |  100  |  9   ----------------------------<  197<H>  3.5   |  23  |  0.71    Ca    9.5      2023 09:17  Mg     1.9         TPro  7.8  /  Alb  4.4  /  TBili  0.6  /  DBili  x   /  AST  41<H>  /  ALT  53<H>  /  AlkPhos  150<H>              MICRO:  Urinalysis Basic - ( 2023 09:55 )    Color: Yellow / Appearance: Slightly Turbid / S.024 / pH: x  Gluc: x / Ketone: Negative  / Bili: Negative / Urobili: Negative   Blood: x / Protein: 30 mg/dL / Nitrite: Negative   Leuk Esterase: Negative / RBC: 1 /hpf / WBC 2 /HPF   Sq Epi: x / Non Sq Epi: x / Bacteria: Many

## 2023-06-07 NOTE — DISCHARGE NOTE PROVIDER - CARE PROVIDER_API CALL
Dagoberto Forbes  Internal Medicine  [address] [place] [id number]  Phone: [phone]  Fax: [phone]  Established Patient  Follow Up Time: 2 weeks

## 2023-06-07 NOTE — PROGRESS NOTE ADULT - PROBLEM SELECTOR PLAN 2
hold ozempic   if symptoms resolved consider restarting at lower, better tolerated dose as outpatient  PAIGE, diabetic diet - A1C 6.7  - Pt states she has not lost weight since starting ozempic. She has stopped taking her metformin outpatient  - Given the hepatic steatosis, obseity, DM, she has metabolic syndrome. F/u lipid panel

## 2023-06-07 NOTE — PROGRESS NOTE ADULT - PROBLEM SELECTOR PLAN 4
low risk Diet: carb consistent kosher. NPO for 4 hours before MRCP  DVT proph: IMPROVE score 0, none. Pt ambulatory   Dispo: Home

## 2023-06-07 NOTE — PROGRESS NOTE ADULT - ASSESSMENT
38F w obesity, fatty liver, DM p/w profuse watery diarrhea after recent uptitration of Ozempic 38F w obesity, fatty liver, DM p/w profuse watery diarrhea after recent uptitration of Ozempic vs infectious. Found to have CBD 6mm on abdominal u/s, pending MRCP.

## 2023-06-07 NOTE — PROGRESS NOTE ADULT - ATTENDING COMMENTS
36 y/o F well known to me from outpatient, on ozempic for DM II, admitted for diarrhea and vomiting. In ED found to have dilated CBD, normal lipase, awaiting MRCP. I saw and examined the patient this morning.   this morning abdominal pains improved though still with diarrhea  A/P:  hold GLP1 agonist  await MRCP  if normal MRCP can likely DC home and I will f/u with her in office.    discussed at length with Dr Ponce, and GI.  I modified the above progress note.,     Dagoberto Forbes, DO  internal medicine  c: [phone]

## 2023-06-07 NOTE — DISCHARGE NOTE PROVIDER - HOSPITAL COURSE
HPI: 38F w DM on Ozempic, fatty liver, obesity p/w ~1 week of diarrhea. Symptoms started with loose stools for a few days but ramped up to >10 episodes per day of watery yellow liquid immediately after she ate any food, nonbloody. Early on associated with N/V and w/mild abdominal discomfort. No CP or SOB. Notably 2 weeks ago pt had her Ozempic dose uptitrated. She returned from [place] 1 month ago. No sick contacts, she did eat sushi last week but no one else developed GI symptoms.    Hospital Course:  Patient received 2L IVF for volume resuscitation. GI PCR was negative. Stool O&P was ____. She had an abdominal ultrasound that showed hepatic steatosis and a common bile duct measuring 6mm. She had an MRCP that showed _____. Pt tolerated regular diet and was discharged home with plans to follow up with PCP outpatient. HPI: 38F w DM on Ozempic, fatty liver, obesity p/w ~1 week of diarrhea. Symptoms started with loose stools for a few days but ramped up to >10 episodes per day of watery yellow liquid immediately after she ate any food, nonbloody. Early on associated with N/V and w/mild abdominal discomfort. No CP or SOB. Notably 2 weeks ago pt had her Ozempic dose uptitrated. She returned from [place] 1 month ago. No sick contacts, she did eat sushi last week but no one else developed GI symptoms.    Hospital Course:  Patient received 2L IVF for volume resuscitation. GI PCR was negative. Stool O&P was negative. She had an abdominal ultrasound that showed hepatic steatosis and a common bile duct measuring 6mm. She had an MRCP that showed hepatic steatosis, no cholelithiasis, choledocholithiasis, or biliary ductal dilation. Pt tolerated regular diet and was discharged home with plans to follow up with PCP outpatient. Ozempic is held on discharge. On the day of discharge, patient was stable.

## 2023-06-07 NOTE — PROGRESS NOTE ADULT - PROBLEM SELECTOR PLAN 3
as above    per pt she has known transaminitis relating to known fatty liver disease  review outpatient records    f/u MRCP to r/o biliary obstruction or other hepatic cause  f/u GI - As above, likely metabolic associated fatty liver disease.

## 2023-06-07 NOTE — PROGRESS NOTE ADULT - SUBJECTIVE AND OBJECTIVE BOX
**** INCOMPLETE NOTE ****    INTERVAL HPI/OVERNIGHT EVENTS:  feeling much better  awaiting MRCP  tolerated PO clears  hungry  MRCP scheduled at 8pm    small loose BM this AM - reports ~1Tbsp of stool; not watery  LUQ pain much better today    MEDICATIONS  (STANDING):  dextrose 5%. 1000 milliLiter(s) (50 mL/Hr) IV Continuous <Continuous>  dextrose 5%. 1000 milliLiter(s) (100 mL/Hr) IV Continuous <Continuous>  dextrose 50% Injectable 25 Gram(s) IV Push once  dextrose 50% Injectable 25 Gram(s) IV Push once  dextrose 50% Injectable 12.5 Gram(s) IV Push once  glucagon  Injectable 1 milliGRAM(s) IntraMuscular once  insulin lispro (ADMELOG) corrective regimen sliding scale   SubCutaneous three times a day before meals  potassium chloride    Tablet ER 40 milliEquivalent(s) Oral once    MEDICATIONS  (PRN):  dextrose Oral Gel 15 Gram(s) Oral once PRN Blood Glucose LESS THAN 70 milliGRAM(s)/deciliter  ondansetron Injectable 4 milliGRAM(s) IV Push every 8 hours PRN Nausea and/or Vomiting      Allergies    No Known Allergies    Intolerances        Review of Systems:      Vital Signs Last 24 Hrs  T(C): 36.9 (2023 23:29), Max: 36.9 (2023 22:02)  T(F): 98.4 (2023 23:29), Max: 98.4 (2023 22:02)  HR: 96 (2023 23:29) (75 - 98)  BP: 107/96 (2023 23:29) (107/96 - 132/86)  BP(mean): --  RR: 18 (2023 23:29) (18 - 20)  SpO2: 98% (2023 23:29) (97% - 99%)    Parameters below as of 2023 23:29  Patient On (Oxygen Delivery Method): room air        PHYSICAL EXAM:      LABS:                        14.2   12.41 )-----------( 204      ( 2023 07:05 )             41.0     06-07    138  |  102  |  6<L>  ----------------------------<  136<H>  3.4<L>   |  21<L>  |  0.58    Ca    9.1      2023 07:05  Mg     1.9         TPro  7.3  /  Alb  4.0  /  TBili  0.8  /  DBili  x   /  AST  32  /  ALT  46<H>  /  AlkPhos  136<H>        Urinalysis Basic - ( 2023 09:55 )    Color: Yellow / Appearance: Slightly Turbid / S.024 / pH: x  Gluc: x / Ketone: Negative  / Bili: Negative / Urobili: Negative   Blood: x / Protein: 30 mg/dL / Nitrite: Negative   Leuk Esterase: Negative / RBC: 1 /hpf / WBC 2 /HPF   Sq Epi: x / Non Sq Epi: x / Bacteria: Many      LIVER FUNCTIONS - ( 2023 07:05 )  Alb: 4.0 g/dL / Pro: 7.3 g/dL / ALK PHOS: 136 U/L / ALT: 46 U/L / AST: 32 U/L / GGT: x             RADIOLOGY & ADDITIONAL TESTS:     INTERVAL HPI/OVERNIGHT EVENTS:  feeling much better  awaiting MRCP  tolerated PO clears  hungry  MRCP scheduled at 8pm    small loose BM this AM - reports ~1Tbsp of stool; not watery  LUQ pain much better today    MEDICATIONS  (STANDING):  dextrose 5%. 1000 milliLiter(s) (50 mL/Hr) IV Continuous <Continuous>  dextrose 5%. 1000 milliLiter(s) (100 mL/Hr) IV Continuous <Continuous>  dextrose 50% Injectable 25 Gram(s) IV Push once  dextrose 50% Injectable 25 Gram(s) IV Push once  dextrose 50% Injectable 12.5 Gram(s) IV Push once  glucagon  Injectable 1 milliGRAM(s) IntraMuscular once  insulin lispro (ADMELOG) corrective regimen sliding scale   SubCutaneous three times a day before meals  potassium chloride    Tablet ER 40 milliEquivalent(s) Oral once    MEDICATIONS  (PRN):  dextrose Oral Gel 15 Gram(s) Oral once PRN Blood Glucose LESS THAN 70 milliGRAM(s)/deciliter  ondansetron Injectable 4 milliGRAM(s) IV Push every 8 hours PRN Nausea and/or Vomiting      Allergies  No Known Allergies      Review of Systems:  see HPI- remainder 10 point ROS negative    Vital Signs Last 24 Hrs  T(C): 36.9 (06 Jun 2023 23:29), Max: 36.9 (06 Jun 2023 22:02)  T(F): 98.4 (06 Jun 2023 23:29), Max: 98.4 (06 Jun 2023 22:02)  HR: 96 (06 Jun 2023 23:29) (75 - 98)  BP: 107/96 (06 Jun 2023 23:29) (107/96 - 132/86)  BP(mean): --  RR: 18 (06 Jun 2023 23:29) (18 - 20)  SpO2: 98% (06 Jun 2023 23:29) (97% - 99%)    Parameters below as of 06 Jun 2023 23:29  Patient On (Oxygen Delivery Method): room air    PHYSICAL EXAM:  Constitutional: NAD, well-developed obese Arabic female ambulating in room; looks comfortable  Neck: No LAD, supple no JVD  Respiratory: Clear b/l no accessory muscle use  Cardiovascular: S1 and S2, RRR  Gastrointestinal: BS+, obese soft ND NT no rebound or rigidity negative Jade's sign  Extremities: No peripheral edema, neg clubbing, cyanosis  WH surgical scar Right ankle  Vascular: 2+ peripheral pulses  Neurological: A/O x 3, no focal deficits  Psychiatric: Normal mood, normal affect  Skin: No rashes, anicteric    LABS:                        14.2   12.41 )-----------( 204      ( 07 Jun 2023 07:05 )             41.0     WBC Count: 12.41 K/uL (06.07.23 @ 07:05)   WBC Count: 16.39 K/uL (06.06.23 @ 09:17)     06-07    138  |  102  |  6<L>  ----------------------------<  136<H>  3.4<L>   |  21<L>  |  0.58    Ca    9.1      07 Jun 2023 07:05  Mg     1.9     06-06    TPro  7.3  /  Alb  4.0  /  TBili  0.8  /  DBili  x   /  AST  32  /  ALT  46<H>  /  AlkPhos  136<H>  06-07    Alkaline Phosphatase, Serum: 136 U/L (06.07.23 @ 07:05)   Alkaline Phosphatase, Serum: 150 U/L (06.06.23 @ 09:17)   Alkaline Phosphatase, Serum: 99 U/L (08.08.16 @ 01:31)     Aspartate Aminotransferase (AST/SGOT): 32 U/L (06.07.23 @ 07:05)   Aspartate Aminotransferase (AST/SGOT): 41 U/L (06.06.23 @ 09:17)   Aspartate Aminotransferase (AST/SGOT): 42 U/L (08.08.16 @ 01:31)     Alanine Aminotransferase (ALT/SGPT): 46 U/L (06.07.23 @ 07:05)   Alanine Aminotransferase (ALT/SGPT): 53 U/L (06.06.23 @ 09:17)   Alanine Aminotransferase (ALT/SGPT): 75 U/L  (08.08.16 @ 01:31)         RADIOLOGY & ADDITIONAL TESTS:

## 2023-06-07 NOTE — PROGRESS NOTE ADULT - PROBLEM SELECTOR PLAN 1
watery profuse diarrhea    ddx includes recent uptitration of Ozempic GLP-1 dose, gastroenteritis, less likely parasitic infection from recent [place] vacation    IV hydration as needed   check GI PCR and o&p    given concomitant LFT elevation - MRCP pending, apprec GI recs - ddx includes recent uptitration of Ozempic GLP-1 dose, gastroenteritis, less likely parasitic infection from recent [place] vacation  - GI PCR negative. Lipase wnl.  - F/u ova and parasites  - Given concomitant LFT elevation and dilated CBD 6mm - MRCP pending - ddx includes recent uptitration of Ozempic GLP-1 dose, gastroenteritis  - GI PCR negative. Lipase wnl.  - F/u ova and parasites  - Given concomitant LFT elevation and dilated CBD 6mm - MRCP pending  will hold further ozempic for this week and reassess as outpatient.,

## 2023-06-07 NOTE — DISCHARGE NOTE PROVIDER - NSDCCPTREATMENT_GEN_ALL_CORE_FT
PRINCIPAL PROCEDURE  Procedure: MR MRCP  Findings and Treatment:       SECONDARY PROCEDURE  Procedure: Complete abdominal ultrasound  Findings and Treatment:   < end of copied text >  FINDINGS:  Liver: Measuring 17.7 cm, increased echogenicity. Smooth contour.  Bile ducts: Normal caliber. Common bile duct measures 6 mm.  Gallbladder: Gallbladder fold, otherwise within normal limits.. No   cholelithiasis, pericholecystic fluid or gallbladder wall thickening.  Pancreas: Limited visualization however visualized portions are within   normal limits.  Spleen: 11.8 cm. Within normal limits.  Right kidney: 11.9 cm. No hydronephrosis.  Left kidney: 12.2 cm. No hydronephrosis.  Ascites: None.  Aorta and IVC: Visualized portions are within normal limits.  IMPRESSION:  No cholelithiasis.  Hepatic stenosis.< from: US Abdomen Complete (US Abdomen Complete .) (06.06.23 @ 12:21) >

## 2023-06-07 NOTE — PROGRESS NOTE ADULT - ASSESSMENT
37 yo F with a PMH of DM, asthma p/w abd pain, diarrhea - increased symptoms since weekend but reported onset coinciding with start of Ozempic 3 months ago. Found to have elevated LFTs and prominent CBD (6mm) on US, negative lipase but +LUQ pain    #abdominal Pain IMPROVED- suspect 2/2 Ozempic, but given dilated CBD need to r/o CBD sludge/stones (may have passed), pancreatitis (though normal lipase)  #elevated LFTs (improving) - suspect 2/2 fatty liver, ?passed stone/sludge   #Dilated CBD- r/o CBD sludge/stones  #Diarrhea - suspect 2/2 Ozempic; IMPROVED  GI PCR negative    RECS:  -OK for ADA low fat diet now, NPO 4 hours before MRCP  -await MRCP  -If tolerating PO diet and MRCP without acute CBD pathology then can dc home  -defer to Medicine re: DM management    Discussed with pt in detail; all questions answered  Discussed with Dr Andrei Salazar PACalvinC    [facility]  [phone]  Available on TEAMS Mon-Fri 8a-4p  After hours and weekend coverage [phone]

## 2023-06-07 NOTE — DISCHARGE NOTE PROVIDER - NSDCCPCAREPLAN_GEN_ALL_CORE_FT
PRINCIPAL DISCHARGE DIAGNOSIS  Diagnosis: Abdominal pain, vomiting, and diarrhea  Assessment and Plan of Treatment: You came to the hospital because you had abdmoinal pain with profuse diarrhea. Your stool did not show signs of infection. You had an abdominal ultrasound that showed a dilatation of your common bile duct so you had an MRCP to check if there was a gallstone causing this dilatation. The MRCP showed....      SECONDARY DISCHARGE DIAGNOSES  Diagnosis: Type 2 diabetes mellitus  Assessment and Plan of Treatment: You were found to have a hemoglibin A1C o f6.7. This is a 3 month measure of your blood sugars. This is considered elevated, and you should follow up with your PCP to determine if you should continue with your ozempic or try another agent.

## 2023-06-08 ENCOUNTER — TRANSCRIPTION ENCOUNTER (OUTPATIENT)
Age: 38
End: 2023-06-08

## 2023-06-08 VITALS
HEART RATE: 81 BPM | SYSTOLIC BLOOD PRESSURE: 128 MMHG | OXYGEN SATURATION: 99 % | RESPIRATION RATE: 18 BRPM | TEMPERATURE: 98 F | DIASTOLIC BLOOD PRESSURE: 90 MMHG

## 2023-06-08 LAB
CULTURE RESULTS: SIGNIFICANT CHANGE UP
GLUCOSE BLDC GLUCOMTR-MCNC: 157 MG/DL — HIGH (ref 70–99)
GLUCOSE BLDC GLUCOMTR-MCNC: 169 MG/DL — HIGH (ref 70–99)
INR BLD: 1.03 RATIO — SIGNIFICANT CHANGE UP (ref 0.88–1.16)
PROTHROM AB SERPL-ACNC: 11.9 SEC — SIGNIFICANT CHANGE UP (ref 10.5–13.4)
SPECIMEN SOURCE: SIGNIFICANT CHANGE UP

## 2023-06-08 PROCEDURE — 83036 HEMOGLOBIN GLYCOSYLATED A1C: CPT

## 2023-06-08 PROCEDURE — 83690 ASSAY OF LIPASE: CPT

## 2023-06-08 PROCEDURE — 87507 IADNA-DNA/RNA PROBE TQ 12-25: CPT

## 2023-06-08 PROCEDURE — 83605 ASSAY OF LACTIC ACID: CPT

## 2023-06-08 PROCEDURE — 96375 TX/PRO/DX INJ NEW DRUG ADDON: CPT

## 2023-06-08 PROCEDURE — 80061 LIPID PANEL: CPT

## 2023-06-08 PROCEDURE — 81001 URINALYSIS AUTO W/SCOPE: CPT

## 2023-06-08 PROCEDURE — 84702 CHORIONIC GONADOTROPIN TEST: CPT

## 2023-06-08 PROCEDURE — 82435 ASSAY OF BLOOD CHLORIDE: CPT

## 2023-06-08 PROCEDURE — 76700 US EXAM ABDOM COMPLETE: CPT

## 2023-06-08 PROCEDURE — 99232 SBSQ HOSP IP/OBS MODERATE 35: CPT

## 2023-06-08 PROCEDURE — 99285 EMERGENCY DEPT VISIT HI MDM: CPT

## 2023-06-08 PROCEDURE — 85018 HEMOGLOBIN: CPT

## 2023-06-08 PROCEDURE — 87177 OVA AND PARASITES SMEARS: CPT

## 2023-06-08 PROCEDURE — 74183 MRI ABD W/O CNTR FLWD CNTR: CPT | Mod: ME

## 2023-06-08 PROCEDURE — 80053 COMPREHEN METABOLIC PANEL: CPT

## 2023-06-08 PROCEDURE — 96374 THER/PROPH/DIAG INJ IV PUSH: CPT

## 2023-06-08 PROCEDURE — 87086 URINE CULTURE/COLONY COUNT: CPT

## 2023-06-08 PROCEDURE — 82947 ASSAY GLUCOSE BLOOD QUANT: CPT

## 2023-06-08 PROCEDURE — G1004: CPT

## 2023-06-08 PROCEDURE — 84295 ASSAY OF SERUM SODIUM: CPT

## 2023-06-08 PROCEDURE — 85025 COMPLETE CBC W/AUTO DIFF WBC: CPT

## 2023-06-08 PROCEDURE — 82962 GLUCOSE BLOOD TEST: CPT

## 2023-06-08 PROCEDURE — 84132 ASSAY OF SERUM POTASSIUM: CPT

## 2023-06-08 PROCEDURE — 85014 HEMATOCRIT: CPT

## 2023-06-08 PROCEDURE — 82330 ASSAY OF CALCIUM: CPT

## 2023-06-08 PROCEDURE — 36415 COLL VENOUS BLD VENIPUNCTURE: CPT

## 2023-06-08 PROCEDURE — 82803 BLOOD GASES ANY COMBINATION: CPT

## 2023-06-08 PROCEDURE — 83735 ASSAY OF MAGNESIUM: CPT

## 2023-06-08 NOTE — PROGRESS NOTE ADULT - PROBLEM SELECTOR PLAN 2
- A1C 6.7  - Pt states she has not lost weight since starting ozempic. She has stopped taking her metformin outpatient  - Given the hepatic steatosis, obseity, DM, she has metabolic syndrome. F/u lipid panel

## 2023-06-08 NOTE — PROGRESS NOTE ADULT - ASSESSMENT
37 yo F with a PMH of DM, asthma p/w abd pain, diarrhea - increased symptoms since weekend but reported onset coinciding with start of Ozempic 3 months ago. Found to have elevated LFTs and prominent CBD (6mm) on US, negative lipase but +LUQ pain     #abdominal Pain IMPROVED- suspect 2/2 Ozempic  MRCP negative  #elevated LFTs (improving) - suspect 2/2 fatty liver  MRCP negative  #Dilated CBD (on US)  - CBD 4mm on MRCP (WNL)   #Diarrhea - suspect 2/2 Ozempic; IMPROVED  GI PCR negative    RECS:  -ADA low fat diet   -defer to Medicine re: DM management    No GI objection to DC    Discussed with pt in detail; all questions answered  Discussed with Dr Andrei Salazar PA-C    [facility]  [phone]  Available on TEAMS Mon-Fri 8a-4p  After hours and weekend coverage [phone]

## 2023-06-08 NOTE — PROGRESS NOTE ADULT - SUBJECTIVE AND OBJECTIVE BOX
PROGRESS NOTE:   Authored by Anita Calderon MD     Patient is a 38y old  Female who presents with a chief complaint of diarrhea (2023 22:28)      SUBJECTIVE / OVERNIGHT EVENTS:    ADDITIONAL REVIEW OF SYSTEMS:    MEDICATIONS  (STANDING):  dextrose 5%. 1000 milliLiter(s) (50 mL/Hr) IV Continuous <Continuous>  dextrose 5%. 1000 milliLiter(s) (100 mL/Hr) IV Continuous <Continuous>  dextrose 50% Injectable 25 Gram(s) IV Push once  dextrose 50% Injectable 25 Gram(s) IV Push once  dextrose 50% Injectable 12.5 Gram(s) IV Push once  glucagon  Injectable 1 milliGRAM(s) IntraMuscular once  insulin lispro (ADMELOG) corrective regimen sliding scale   SubCutaneous three times a day before meals    MEDICATIONS  (PRN):  dextrose Oral Gel 15 Gram(s) Oral once PRN Blood Glucose LESS THAN 70 milliGRAM(s)/deciliter  ondansetron Injectable 4 milliGRAM(s) IV Push every 8 hours PRN Nausea and/or Vomiting      CAPILLARY BLOOD GLUCOSE      POCT Blood Glucose.: 103 mg/dL (2023 17:31)  POCT Blood Glucose.: 121 mg/dL (2023 12:09)  POCT Blood Glucose.: 124 mg/dL (2023 09:15)    I&O's Summary    2023 07:01  -  2023 07:00  --------------------------------------------------------  IN: 240 mL / OUT: 0 mL / NET: 240 mL        PHYSICAL EXAM:  Vital Signs Last 24 Hrs  T(C): 36.6 (2023 03:45), Max: 36.8 (2023 14:03)  T(F): 97.9 (2023 03:45), Max: 98.3 (2023 14:03)  HR: 83 (2023 03:45) (83 - 98)  BP: 111/82 (2023 03:45) (111/82 - 144/99)  BP(mean): --  RR: 18 (2023 03:45) (18 - 18)  SpO2: 98% (2023 03:45) (98% - 99%)    Parameters below as of 2023 03:45  Patient On (Oxygen Delivery Method): room air        GENERAL: No acute distress, well-developed  HEAD:  Atraumatic, Normocephalic  EYES: EOMI, PERRLA, conjunctiva and sclera clear  NECK: Supple, no lymphadenopathy, no JVD  CHEST/LUNG: CTAB; No wheezes, rales, or rhonchi  HEART: Regular rate and rhythm; No murmurs, rubs, or gallops  ABDOMEN: Soft, non-tender, non-distended; normal bowel sounds, no organomegaly  EXTREMITIES:  2+ peripheral pulses b/l, No clubbing, cyanosis, or edema  NEUROLOGY: A&O x 3, no focal deficits  SKIN: No rashes or lesions    LABS:                        14.2   12.41 )-----------( 204      ( 2023 07:05 )             41.0     06-07    138  |  102  |  6<L>  ----------------------------<  136<H>  3.4<L>   |  21<L>  |  0.58    Ca    9.1      2023 07:05  Mg     1.9     06-06    TPro  7.3  /  Alb  4.0  /  TBili  0.8  /  DBili  x   /  AST  32  /  ALT  46<H>  /  AlkPhos  136<H>  06-07          Urinalysis Basic - ( 2023 09:55 )    Color: Yellow / Appearance: Slightly Turbid / S.024 / pH: x  Gluc: x / Ketone: Negative  / Bili: Negative / Urobili: Negative   Blood: x / Protein: 30 mg/dL / Nitrite: Negative   Leuk Esterase: Negative / RBC: 1 /hpf / WBC 2 /HPF   Sq Epi: x / Non Sq Epi: x / Bacteria: Many        Culture - Urine (collected 2023 09:55)  Source: Clean Catch Clean Catch (Midstream)  Final Report (2023 12:05):    <10,000 CFU/mL Normal Urogenital Malia        RADIOLOGY & ADDITIONAL TESTS:  Results Reviewed:   Imaging Personally Reviewed:  Electrocardiogram Personally Reviewed:    COORDINATION OF CARE:  Care Discussed with Consultants/Other Providers [Y/N]:  Prior or Outpatient Records Reviewed [Y/N]:   PROGRESS NOTE:   Authored by Anita Calderon MD     Patient is a 38y old  Female who presents with a chief complaint of diarrhea (2023 22:28)      SUBJECTIVE / OVERNIGHT EVENT:   Patient tolerated diet, no N/V.     ADDITIONAL REVIEW OF SYSTEMS:    MEDICATIONS  (STANDING):  dextrose 5%. 1000 milliLiter(s) (50 mL/Hr) IV Continuous <Continuous>  dextrose 5%. 1000 milliLiter(s) (100 mL/Hr) IV Continuous <Continuous>  dextrose 50% Injectable 25 Gram(s) IV Push once  dextrose 50% Injectable 25 Gram(s) IV Push once  dextrose 50% Injectable 12.5 Gram(s) IV Push once  glucagon  Injectable 1 milliGRAM(s) IntraMuscular once  insulin lispro (ADMELOG) corrective regimen sliding scale   SubCutaneous three times a day before meals    MEDICATIONS  (PRN):  dextrose Oral Gel 15 Gram(s) Oral once PRN Blood Glucose LESS THAN 70 milliGRAM(s)/deciliter  ondansetron Injectable 4 milliGRAM(s) IV Push every 8 hours PRN Nausea and/or Vomiting      CAPILLARY BLOOD GLUCOSE      POCT Blood Glucose.: 103 mg/dL (2023 17:31)  POCT Blood Glucose.: 121 mg/dL (2023 12:09)  POCT Blood Glucose.: 124 mg/dL (2023 09:15)    I&O's Summary    2023 07:01  -  2023 07:00  --------------------------------------------------------  IN: 240 mL / OUT: 0 mL / NET: 240 mL        PHYSICAL EXAM:  Vital Signs Last 24 Hrs  T(C): 36.6 (2023 03:45), Max: 36.8 (2023 14:03)  T(F): 97.9 (2023 03:45), Max: 98.3 (2023 14:03)  HR: 83 (2023 03:45) (83 - 98)  BP: 111/82 (2023 03:45) (111/82 - 144/99)  BP(mean): --  RR: 18 (2023 03:45) (18 - 18)  SpO2: 98% (2023 03:45) (98% - 99%)    Parameters below as of 2023 03:45  Patient On (Oxygen Delivery Method): room air        GENERAL: No acute distress, well-developed  HEAD:  Atraumatic, Normocephalic  EYES: EOMI, PERRLA, conjunctiva and sclera clear  NECK: Supple, no lymphadenopathy, no JVD  CHEST/LUNG: CTAB; No wheezes, rales, or rhonchi  HEART: Regular rate and rhythm; No murmurs, rubs, or gallops  ABDOMEN: Soft, non-tender, non-distended; normal bowel sounds, no organomegaly  EXTREMITIES:  2+ peripheral pulses b/l, No clubbing, cyanosis, or edema  NEUROLOGY: A&O x 3, no focal deficits  SKIN: No rashes or lesions    LABS:                        14.2   12.41 )-----------( 204      ( 2023 07:05 )             41.0     06-07    138  |  102  |  6<L>  ----------------------------<  136<H>  3.4<L>   |  21<L>  |  0.58    Ca    9.1      2023 07:05  Mg     1.9     06-06    TPro  7.3  /  Alb  4.0  /  TBili  0.8  /  DBili  x   /  AST  32  /  ALT  46<H>  /  AlkPhos  136<H>  06-07          Urinalysis Basic - ( 2023 09:55 )    Color: Yellow / Appearance: Slightly Turbid / S.024 / pH: x  Gluc: x / Ketone: Negative  / Bili: Negative / Urobili: Negative   Blood: x / Protein: 30 mg/dL / Nitrite: Negative   Leuk Esterase: Negative / RBC: 1 /hpf / WBC 2 /HPF   Sq Epi: x / Non Sq Epi: x / Bacteria: Many        Culture - Urine (collected 2023 09:55)  Source: Clean Catch Clean Catch (Midstream)  Final Report (2023 12:05):    <10,000 CFU/mL Normal Urogenital Malia        RADIOLOGY & ADDITIONAL TESTS:  Results Reviewed:   Imaging Personally Reviewed:  Electrocardiogram Personally Reviewed:    COORDINATION OF CARE:  Care Discussed with Consultants/Other Providers [Y/N]:  Prior or Outpatient Records Reviewed [Y/N]:

## 2023-06-08 NOTE — PROGRESS NOTE ADULT - SUBJECTIVE AND OBJECTIVE BOX
INTERVAL HPI/OVERNIGHT EVENTS:  tolerating PO  mild LUQ "tightness"  no nausea or vomiting  no fever or chills    eager to go home    MEDICATIONS  (STANDING):  dextrose 5%. 1000 milliLiter(s) (50 mL/Hr) IV Continuous <Continuous>  dextrose 5%. 1000 milliLiter(s) (100 mL/Hr) IV Continuous <Continuous>  dextrose 50% Injectable 25 Gram(s) IV Push once  dextrose 50% Injectable 12.5 Gram(s) IV Push once  dextrose 50% Injectable 25 Gram(s) IV Push once  glucagon  Injectable 1 milliGRAM(s) IntraMuscular once  insulin lispro (ADMELOG) corrective regimen sliding scale   SubCutaneous three times a day before meals    MEDICATIONS  (PRN):  dextrose Oral Gel 15 Gram(s) Oral once PRN Blood Glucose LESS THAN 70 milliGRAM(s)/deciliter  ondansetron Injectable 4 milliGRAM(s) IV Push every 8 hours PRN Nausea and/or Vomiting      Allergies  No Known Allergies      Review of Systems:  see HPI- remainder 10 point ROS negative      Vital Signs Last 24 Hrs  T(C): 36.7 (08 Jun 2023 12:09), Max: 36.8 (07 Jun 2023 14:03)  T(F): 98 (08 Jun 2023 12:09), Max: 98.3 (07 Jun 2023 14:03)  HR: 81 (08 Jun 2023 12:09) (81 - 98)  BP: 128/90 (08 Jun 2023 12:09) (111/82 - 144/99)  BP(mean): --  RR: 18 (08 Jun 2023 12:09) (18 - 18)  SpO2: 99% (08 Jun 2023 12:09) (98% - 99%)    Parameters below as of 08 Jun 2023 12:09  Patient On (Oxygen Delivery Method): room air    PHYSICAL EXAM:  Constitutional: NAD, well-developed obese [place] female sitting at edge of bed, fully dressed. looks comfortable  Neck: No LAD, supple no JVD  Respiratory: Clear b/l no accessory muscle use  Cardiovascular: S1 and S2, RRR  Gastrointestinal: BS+, obese soft ND NT no rebound or rigidity negative Jade's sign  Extremities: No peripheral edema, neg clubbing, cyanosis  WH surgical scar Right ankle  Vascular: 2+ peripheral pulses  Neurological: A/O x 3, no focal deficits  Psychiatric: Normal mood, normal affect  Skin: No rashes, anicteric      LABS:                        14.2   12.41 )-----------( 204      ( 07 Jun 2023 07:05 )             41.0     06-07    138  |  102  |  6<L>  ----------------------------<  136<H>  3.4<L>   |  21<L>  |  0.58    Ca    9.1      07 Jun 2023 07:05    TPro  7.3  /  Alb  4.0  /  TBili  0.8  /  DBili  x   /  AST  32  /  ALT  46<H>  /  AlkPhos  136<H>  06-07    PT/INR - ( 08 Jun 2023 06:55 )   PT: 11.9 sec;   INR: 1.03 ratio        RADIOLOGY & ADDITIONAL TESTS:    ACC: [id number] EXAM:  MR ABDOMEN WAW IC   ORDERED BY:  SKYLAR LEMON     PROCEDURE DATE:  06/07/2023          INTERPRETATION:  CLINICAL INFORMATION: Concern for pancreatitis. Mildly   dilated dilation of the CBD. Elevated LFTs.    COMPARISON: 6/6/2023 ultrasound.    CONTRAST/COMPLICATIONS:  IV Contrast: Gadavist  7.5 cc administered   0 cc discarded  Oral Contrast: NONE  Complications: None reported at time of study completion    PROCEDURE:  MRI of the abdomen was performed.  MRCP was performed.    FINDINGS:  LOWER CHEST: Within normal limits.    LIVER: Steatosis. No focal liver lesion.  BILE DUCTS: Normal caliber. CBD measures 4 mm. No choledocholithiasis.  GALLBLADDER: Contracted. No cholelithiasis.  SPLEEN: Within normal limits.  PANCREAS: Within normal limits.  ADRENALS: Within normal limits.  KIDNEYS/URETERS: Within normal limits.    VISUALIZED PORTIONS:  BOWEL: Within normal limits.  PERITONEUM: No ascites.  VESSELS: Within normal limits.  RETROPERITONEUM/LYMPH NODES: Prominent periportal lymph nodes, typically   reactive to fatty liver disease.  ABDOMINAL WALL: Within normal limits.  BONES: Within normal limits.    IMPRESSION:  *  No cholelithiasis, choledocholithiasis or biliary ductal dilatation.   No evidence of acute pancreatitis.  *  Hepatic steatosis.    --- End of Report ---

## 2023-06-08 NOTE — DISCHARGE NOTE NURSING/CASE MANAGEMENT/SOCIAL WORK - PATIENT PORTAL LINK FT
You can access the FollowMyHealth Patient Portal offered by [facility] by registering at the following website: [email or web address]. By joining [facility]s FollowMyHealth portal, you will also be able to view your health information using other applications (apps) compatible with our system.

## 2023-06-08 NOTE — DISCHARGE NOTE NURSING/CASE MANAGEMENT/SOCIAL WORK - NSDCPEFALRISK_GEN_ALL_CORE
For information on Fall & Injury Prevention, visit: [email or web address] OR  [email or web address] OR  https://www.cdc.gov/steadi/patient.html

## 2023-06-08 NOTE — PROGRESS NOTE ADULT - NS ATTEND AMEND GEN_ALL_CORE FT
38y F adm with abd pain NV and elev transaminases  US abd and MRI?MRCP showed CBD 4-6mm, no gall stones  doing well  agree with DC

## 2023-06-08 NOTE — PROGRESS NOTE ADULT - ASSESSMENT
38F w obesity, fatty liver, DM p/w profuse watery diarrhea after recent uptitration of Ozempic vs infectious. Found to have CBD 6mm on abdominal u/s, pending MRCP.

## 2023-06-08 NOTE — PROGRESS NOTE ADULT - PROBLEM SELECTOR PLAN 1
- ddx includes recent uptitration of Ozempic GLP-1 dose, gastroenteritis  - GI PCR negative. Lipase wnl.  - F/u ova and parasites  - Given concomitant LFT elevation and dilated CBD 6mm - MRCP pending  will hold further ozempic for this week and reassess as outpatient., - ddx includes recent uptitration of Ozempic GLP-1 dose, gastroenteritis  - GI PCR negative. Lipase wnl.  - F/u ova and parasites  - Given concomitant LFT elevation and dilated CBD 6mm   -MRCP with hepatic steatosis  will hold further ozempic for this week and reassess as outpatient.,

## 2023-06-08 NOTE — PROGRESS NOTE ADULT - PROBLEM SELECTOR PLAN 4
Diet: carb consistent kosher. NPO for 4 hours before MRCP  DVT proph: IMPROVE score 0, none. Pt ambulatory   Dispo: Home

## 2023-06-10 LAB
MELD SCORE WITH DIALYSIS: SIGNIFICANT CHANGE UP
MELD SCORE WITHOUT DIALYSIS: SIGNIFICANT CHANGE UP POINTS

## 2023-06-13 NOTE — H&P PST ADULT - [facility] HE PE MLT D E PC
no murmur/regular rate and rhythm Complex Repair And Ftsg Text: The defect edges were debeveled with a #15 scalpel blade.  The primary defect was closed partially with a complex linear closure.  Given the location of the defect, shape of the defect and the proximity to free margins a full thickness skin graft was deemed most appropriate to repair the remaining defect.  The graft was trimmed to fit the size of the remaining defect.  The graft was then placed in the primary defect, oriented appropriately, and sutured into place.

## 2024-03-05 ENCOUNTER — NON-APPOINTMENT (OUTPATIENT)
Age: 39
End: 2024-03-05

## 2024-07-18 PROBLEM — E11.9 TYPE 2 DIABETES MELLITUS WITHOUT COMPLICATIONS: Chronic | Status: ACTIVE | Noted: 2023-06-06

## 2024-07-18 PROBLEM — K76.0 FATTY (CHANGE OF) LIVER, NOT ELSEWHERE CLASSIFIED: Chronic | Status: ACTIVE | Noted: 2023-06-06

## 2024-07-24 ENCOUNTER — APPOINTMENT (OUTPATIENT)
Dept: OBGYN | Facility: CLINIC | Age: 39
End: 2024-07-24
Payer: MEDICAID

## 2024-07-24 VITALS
BODY MASS INDEX: 34.07 KG/M2 | DIASTOLIC BLOOD PRESSURE: 90 MMHG | HEIGHT: 59 IN | SYSTOLIC BLOOD PRESSURE: 133 MMHG | WEIGHT: 169 LBS | HEART RATE: 83 BPM

## 2024-07-24 DIAGNOSIS — Z01.419 ENCOUNTER FOR GYNECOLOGICAL EXAMINATION (GENERAL) (ROUTINE) W/OUT ABNORMAL FINDINGS: ICD-10-CM

## 2024-07-24 PROCEDURE — 99386 PREV VISIT NEW AGE 40-64: CPT

## 2024-07-24 PROCEDURE — 99459 PELVIC EXAMINATION: CPT

## 2024-07-24 NOTE — PHYSICAL EXAM
[Chaperone Present] : A chaperone was present in the examining room during all aspects of the physical examination [[id number]] : A chaperone was present during the pelvic exam. [FreeTextEntry2] : Lizbet SHEPARD [Appropriately responsive] : appropriately responsive [Alert] : alert [No Acute Distress] : no acute distress [Regular Rate Rhythm] : regular rate rhythm [Soft] : soft [Non-tender] : non-tender [Non-distended] : non-distended [No Lesions] : no lesions [No Mass] : no mass [Oriented x3] : oriented x3 [FreeTextEntry5] : non labored breathing [Examination Of The Breasts] : a normal appearance [No Masses] : no breast masses were palpable [Labia Majora] : normal [Labia Minora] : normal [Normal] : normal [Uterine Adnexae] : normal

## 2024-07-24 NOTE — PLAN
[FreeTextEntry1] : 40 y/o P0 LMP 7/9/24 for well woman visit  Plan: - Pap test, STI screening, GC/CT - referral for mammogram/breast sono, patient states she will not have mammogram but will do sono - referral for pelvic sono for pain - will f/u results, RTO for annual or PRN

## 2024-07-24 NOTE — HISTORY OF PRESENT ILLNESS
[FreeTextEntry1] : 40 y/o P0 LMP 7/9/24 presents for well woman visit. Reports she recently broke up with her bf because he was cheating on her so wants STI testing. Also reports pelvic pain, denies constipation, change in appetite.  OB: nulliparous GYN: denies PMH: denies PSH: denies FH: denies Meds: denies Allergies: NKDA Social: denies toxic habits

## 2024-07-25 LAB
C TRACH RRNA SPEC QL NAA+PROBE: NOT DETECTED
HBV SURFACE AG SER QL: NONREACTIVE
HCV AB SER QL: NONREACTIVE
HCV S/CO RATIO: 0.11 S/CO
HIV1+2 AB SPEC QL IA.RAPID: NONREACTIVE
N GONORRHOEA RRNA SPEC QL NAA+PROBE: NOT DETECTED
SOURCE TP AMPLIFICATION: NORMAL
T PALLIDUM AB SER QL IA: NEGATIVE

## 2024-07-26 LAB — HPV HIGH+LOW RISK DNA PNL CVX: NOT DETECTED

## 2024-07-29 LAB — CYTOLOGY CVX/VAG DOC THIN PREP: NORMAL

## 2024-08-07 ENCOUNTER — RESULT REVIEW (OUTPATIENT)
Age: 39
End: 2024-08-07

## 2024-08-07 ENCOUNTER — APPOINTMENT (OUTPATIENT)
Dept: ULTRASOUND IMAGING | Facility: CLINIC | Age: 39
End: 2024-08-07

## 2024-08-07 ENCOUNTER — APPOINTMENT (OUTPATIENT)
Dept: MAMMOGRAPHY | Facility: CLINIC | Age: 39
End: 2024-08-07

## 2024-08-07 PROCEDURE — 76641 ULTRASOUND BREAST COMPLETE: CPT | Mod: 50

## 2024-08-07 PROCEDURE — 77067 SCR MAMMO BI INCL CAD: CPT

## 2024-08-07 PROCEDURE — 77063 BREAST TOMOSYNTHESIS BI: CPT

## 2024-08-08 ENCOUNTER — RESULT REVIEW (OUTPATIENT)
Age: 39
End: 2024-08-08

## 2024-08-08 PROBLEM — R92.8 ABNORMAL FINDING ON BREAST IMAGING: Status: ACTIVE | Noted: 2024-08-08

## 2024-08-12 ENCOUNTER — APPOINTMENT (OUTPATIENT)
Dept: ULTRASOUND IMAGING | Facility: CLINIC | Age: 39
End: 2024-08-12
Payer: MEDICAID

## 2024-08-12 PROCEDURE — 76830 TRANSVAGINAL US NON-OB: CPT

## 2024-08-14 ENCOUNTER — APPOINTMENT (OUTPATIENT)
Dept: MAMMOGRAPHY | Facility: IMAGING CENTER | Age: 39
End: 2024-08-14
Payer: MEDICAID

## 2024-08-14 ENCOUNTER — RESULT REVIEW (OUTPATIENT)
Age: 39
End: 2024-08-14

## 2024-08-14 ENCOUNTER — APPOINTMENT (OUTPATIENT)
Dept: ULTRASOUND IMAGING | Facility: IMAGING CENTER | Age: 39
End: 2024-08-14
Payer: MEDICAID

## 2024-08-14 PROCEDURE — 76642 ULTRASOUND BREAST LIMITED: CPT | Mod: 26,LT

## 2024-08-14 PROCEDURE — 77065 DX MAMMO INCL CAD UNI: CPT | Mod: 26,LT

## 2024-08-14 PROCEDURE — G0279: CPT | Mod: 26

## 2024-11-13 NOTE — PATIENT PROFILE ADULT - FUNCTIONAL SCREEN CURRENT LEVEL: COMMUNICATION, MLM
Specialty Pharmacy Patient Management Program  Per Protocol Prescription Order or Refill       Requested Prescriptions     Signed Prescriptions Disp Refills    hydroxyurea (HYDREA) 500 MG capsule 120 capsule 1     Sig: TAKE 1 CAPSULE BY MOUTH ONCE DAILY FOR 2 DAYS AND THEN 2 CAPSULES EVERY THIRD DAY.     Authorizing Provider: HA DESHPANDE II     Ordering User: MATT STEWART     Prescription orders above were sent to  [facility]  per Collaborative Care Agreement Protocol.     Completed independent double check on medication order/RX.    Jaylin Smith, PharmD, [facility]  Clinical Specialty Pharmacist, Oncology  11/13/2024  15:30 EST   
 Specialty Pharmacy Patient Management Program  Per Protocol Prescription Order or Refill     Patient will be filling or currently fills medications at  [facility]  and is enrolled in the Patient Management Program.    Requested Prescriptions     Pending Prescriptions Disp Refills    hydroxyurea (HYDREA) 500 MG capsule [Pharmacy Med Name: Hydroxyurea 500 MG Oral Capsule] 120 capsule 1     Sig: TAKE 1 CAPSULE BY MOUTH ONCE DAILY FOR 2 DAYS AND THEN 2 CAPSULES EVERY THIRD DAY.     Prescription orders above were sent to the pharmacy per Collaborative Care Agreement Protocol.     Last Office Visit: 8/16/24  Next Office Visit: 3/7/25    Oc Ramsey, PharmD, BCOP  Clinical Specialty Pharmacist, Oncology  11/13/2024  12:57 EST    
0 = understands/communicates without difficulty